# Patient Record
Sex: FEMALE | Race: WHITE | NOT HISPANIC OR LATINO | Employment: FULL TIME | URBAN - METROPOLITAN AREA
[De-identification: names, ages, dates, MRNs, and addresses within clinical notes are randomized per-mention and may not be internally consistent; named-entity substitution may affect disease eponyms.]

---

## 2017-03-16 ENCOUNTER — OFFICE VISIT (OUTPATIENT)
Dept: INTERNAL MEDICINE | Facility: CLINIC | Age: 22
End: 2017-03-16

## 2017-03-16 VITALS
RESPIRATION RATE: 18 BRPM | DIASTOLIC BLOOD PRESSURE: 83 MMHG | SYSTOLIC BLOOD PRESSURE: 128 MMHG | HEIGHT: 66 IN | BODY MASS INDEX: 40.5 KG/M2 | HEART RATE: 88 BPM | WEIGHT: 252 LBS | OXYGEN SATURATION: 98 % | TEMPERATURE: 98.4 F

## 2017-03-16 DIAGNOSIS — F41.8 DEPRESSION WITH ANXIETY: ICD-10-CM

## 2017-03-16 DIAGNOSIS — R63.1 POLYDIPSIA: ICD-10-CM

## 2017-03-16 DIAGNOSIS — F40.10 SOCIAL ANXIETY DISORDER: ICD-10-CM

## 2017-03-16 DIAGNOSIS — R53.83 FATIGUE, UNSPECIFIED TYPE: Primary | ICD-10-CM

## 2017-03-16 DIAGNOSIS — F34.1 PERSISTENT DEPRESSIVE DISORDER: Primary | ICD-10-CM

## 2017-03-16 DIAGNOSIS — R53.83 FATIGUE, UNSPECIFIED TYPE: ICD-10-CM

## 2017-03-16 LAB
ALBUMIN SERPL-MCNC: 3.6 G/DL (ref 3.4–5)
ALBUMIN UR-MCNC: NEGATIVE MG/DL
ALP SERPL-CCNC: 85 U/L (ref 40–150)
ALT SERPL W P-5'-P-CCNC: 23 U/L (ref 0–50)
AMORPH CRY #/AREA URNS HPF: ABNORMAL /HPF
ANION GAP SERPL CALCULATED.3IONS-SCNC: 9 MMOL/L (ref 3–14)
APPEARANCE UR: ABNORMAL
AST SERPL W P-5'-P-CCNC: 11 U/L (ref 0–45)
BACTERIA #/AREA URNS HPF: ABNORMAL /HPF
BASOPHILS # BLD AUTO: 0.1 10E9/L (ref 0–0.2)
BASOPHILS NFR BLD AUTO: 0.4 %
BILIRUB SERPL-MCNC: 0.4 MG/DL (ref 0.2–1.3)
BILIRUB UR QL STRIP: NEGATIVE
BUN SERPL-MCNC: 12 MG/DL (ref 7–30)
CALCIUM SERPL-MCNC: 9.1 MG/DL (ref 8.5–10.1)
CHLORIDE SERPL-SCNC: 106 MMOL/L (ref 94–109)
CO2 SERPL-SCNC: 27 MMOL/L (ref 20–32)
COLOR UR AUTO: YELLOW
CREAT SERPL-MCNC: 0.93 MG/DL (ref 0.52–1.04)
DIFFERENTIAL METHOD BLD: ABNORMAL
EOSINOPHIL # BLD AUTO: 0.2 10E9/L (ref 0–0.7)
EOSINOPHIL NFR BLD AUTO: 1.6 %
ERYTHROCYTE [DISTWIDTH] IN BLOOD BY AUTOMATED COUNT: 14.6 % (ref 10–15)
FERRITIN SERPL-MCNC: 33 NG/ML (ref 12–150)
GFR SERPL CREATININE-BSD FRML MDRD: 76 ML/MIN/1.7M2
GLUCOSE SERPL-MCNC: 90 MG/DL (ref 70–99)
GLUCOSE UR STRIP-MCNC: NEGATIVE MG/DL
HBA1C MFR BLD: 5.6 % (ref 4.3–6)
HCT VFR BLD AUTO: 44 % (ref 35–47)
HGB BLD-MCNC: 13.8 G/DL (ref 11.7–15.7)
HGB UR QL STRIP: NEGATIVE
IMM GRANULOCYTES # BLD: 0 10E9/L (ref 0–0.4)
IMM GRANULOCYTES NFR BLD: 0.4 %
KETONES UR STRIP-MCNC: NEGATIVE MG/DL
LEUKOCYTE ESTERASE UR QL STRIP: ABNORMAL
LYMPHOCYTES # BLD AUTO: 2.6 10E9/L (ref 0.8–5.3)
LYMPHOCYTES NFR BLD AUTO: 22.7 %
MCH RBC QN AUTO: 28.1 PG (ref 26.5–33)
MCHC RBC AUTO-ENTMCNC: 31.4 G/DL (ref 31.5–36.5)
MCV RBC AUTO: 90 FL (ref 78–100)
MONOCYTES # BLD AUTO: 0.8 10E9/L (ref 0–1.3)
MONOCYTES NFR BLD AUTO: 7.4 %
MUCOUS THREADS #/AREA URNS LPF: PRESENT /LPF
NEUTROPHILS # BLD AUTO: 7.7 10E9/L (ref 1.6–8.3)
NEUTROPHILS NFR BLD AUTO: 67.5 %
NITRATE UR QL: NEGATIVE
NRBC # BLD AUTO: 0 10*3/UL
NRBC BLD AUTO-RTO: 0 /100
PH UR STRIP: 7 PH (ref 5–7)
PLATELET # BLD AUTO: 364 10E9/L (ref 150–450)
POTASSIUM SERPL-SCNC: 3.8 MMOL/L (ref 3.4–5.3)
PROT SERPL-MCNC: 8.4 G/DL (ref 6.8–8.8)
RBC # BLD AUTO: 4.91 10E12/L (ref 3.8–5.2)
RBC #/AREA URNS AUTO: 3 /HPF (ref 0–2)
SODIUM SERPL-SCNC: 142 MMOL/L (ref 133–144)
SP GR UR STRIP: 1.03 (ref 1–1.03)
SQUAMOUS #/AREA URNS AUTO: 5 /HPF (ref 0–1)
TSH SERPL DL<=0.005 MIU/L-ACNC: 2 MU/L (ref 0.4–4)
URN SPEC COLLECT METH UR: ABNORMAL
UROBILINOGEN UR STRIP-MCNC: 2 MG/DL (ref 0–2)
WBC # BLD AUTO: 11.4 10E9/L (ref 4–11)
WBC #/AREA URNS AUTO: 5 /HPF (ref 0–2)

## 2017-03-16 RX ORDER — FLUOXETINE 40 MG/1
CAPSULE ORAL
Qty: 60 CAPSULE | Refills: 1 | Status: SHIPPED | OUTPATIENT
Start: 2017-03-16 | End: 2017-08-21

## 2017-03-16 ASSESSMENT — ENCOUNTER SYMPTOMS
HOARSE VOICE: 0
HYPERTENSION: 0
MYALGIAS: 0
HYPOTENSION: 0
WEAKNESS: 0
FLANK PAIN: 0
SMELL DISTURBANCE: 0
MUSCLE WEAKNESS: 0
FATIGUE: 1
DYSURIA: 0
DEPRESSION: 1
BREAST MASS: 0
POLYDIPSIA: 1
DIZZINESS: 0
NERVOUS/ANXIOUS: 1
RECTAL PAIN: 0
LOSS OF CONSCIOUSNESS: 0
RESPIRATORY PAIN: 0
BLOATING: 0
SYNCOPE: 0
DECREASED CONCENTRATION: 1
JAUNDICE: 0
CONSTIPATION: 0
EXERCISE INTOLERANCE: 0
HEMATURIA: 0
LEG PAIN: 0
JOINT SWELLING: 0
SWOLLEN GLANDS: 0
HALLUCINATIONS: 0
DIARRHEA: 0
BREAST PAIN: 0
NECK PAIN: 0
HOT FLASHES: 0
SNORES LOUDLY: 0
POLYPHAGIA: 0
SPUTUM PRODUCTION: 0
TREMORS: 0
TACHYCARDIA: 0
DISTURBANCES IN COORDINATION: 0
WEIGHT GAIN: 0
DIFFICULTY URINATING: 0
POOR WOUND HEALING: 0
CHILLS: 0
INSOMNIA: 1
ABDOMINAL PAIN: 0
EYE REDNESS: 0
ALTERED TEMPERATURE REGULATION: 0
CLAUDICATION: 0
EYE PAIN: 0
SINUS CONGESTION: 0
COUGH DISTURBING SLEEP: 0
NUMBNESS: 0
PANIC: 1
HEMOPTYSIS: 0
TINGLING: 0
SLEEP DISTURBANCES DUE TO BREATHING: 0
MUSCLE CRAMPS: 0
PALPITATIONS: 0
BACK PAIN: 1
EYE IRRITATION: 0
INCREASED ENERGY: 1
FEVER: 0
SINUS PAIN: 0
NAIL CHANGES: 0
DECREASED LIBIDO: 0
DECREASED APPETITE: 1
COUGH: 0
LEG SWELLING: 0
HEADACHES: 0
DOUBLE VISION: 0
ORTHOPNEA: 0
EYE WATERING: 0
SHORTNESS OF BREATH: 0
MEMORY LOSS: 0
NECK MASS: 0
WHEEZING: 0
EXTREMITY NUMBNESS: 0
POSTURAL DYSPNEA: 0
DYSPNEA ON EXERTION: 0
SEIZURES: 0
SPEECH CHANGE: 0
PARALYSIS: 0
NIGHT SWEATS: 0
SORE THROAT: 0
WEIGHT LOSS: 0
TROUBLE SWALLOWING: 0
RECTAL BLEEDING: 0
BRUISES/BLEEDS EASILY: 0
NAUSEA: 0
VOMITING: 0
ARTHRALGIAS: 1
HEARTBURN: 0
STIFFNESS: 0
SKIN CHANGES: 0
BOWEL INCONTINENCE: 0
TASTE DISTURBANCE: 0
LIGHT-HEADEDNESS: 0
BLOOD IN STOOL: 0

## 2017-03-16 ASSESSMENT — ACTIVITIES OF DAILY LIVING (ADL)
DO_MEMBERS_OF_YOUR_HOUSEHOLD_USE_SUNSCREEN?: Y
ARE_THERE_SMOKE_DETECTORS_IN_YOUR_HOME?: Y
ARE_THERE_FIREARMS_IN_YOUR_HOME?: Y
DO_MEMBERS_OF_YOUR_HOUSEHOLD_USE_SAFETY_HELMETS?: Y
DO_MEMBERS_OF_YOUR_HOUSEHOLD_WEAR_SEAT_BELTS?: Y
ARE_THERE_CARBON_MONOXIDE_DETECTORS_IN_YOUR_HOME?: Y

## 2017-03-16 ASSESSMENT — PAIN SCALES - GENERAL: PAINLEVEL: NO PAIN (0)

## 2017-03-16 NOTE — PROGRESS NOTES
HPI:       Jennifer Craft is a 22 year old female who presents for the following  Patient presents with: Establish Care (Patient is here to establish a new PCP)    Desire is here to establish care. She has been quite tired lately, sleeping 14+hrs at a time and not feeling rested. This has been present for several months. It started when stress at school was increased, which led to not eating right (fast food). She also has been thirsty at times. She is drinking 4-5 glasses/day, along with mild, soda. She is not sure she is urinating more.     She also notes depression-like periods, b/c nothing formally diagnosed. She had similar symptoms back in high school. And short instances of this since then.     She has an IUD in place and is due to be removed/replaced in 12/2017.     Problem, Medication and Allergy Lists were reviewed and are current.  Patient is a new patient to this clinic and so  I reviewed/updated the Past Medical History, the Family History and the Social History.     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 Now, the inability to concentrate has impacted her school. She wasn't able to get up for class and started failing classes. She is now looking for work.       Review of Systems:   Review of Systems     Constitutional:  Positive for fatigue, decreased appetite and recent stressors. Negative for fever, chills, weight loss, weight gain, night sweats, height loss, post-operative complications, incisional pain, hallucinations, hyperactivity and confused.   HENT:  Negative for ear pain, hearing loss, tinnitus, nosebleeds, trouble swallowing, hoarse voice, mouth sores, sore throat, ear discharge, tooth pain, gum tenderness, taste disturbance, smell disturbance, hearing aid, bleeding gums, dry mouth, sinus pain, sinus congestion and neck mass.    Eyes:  Negative for double vision, pain, redness, eye pain, decreased vision,  eye watering, eye bulging, eye dryness, flashing lights, spots, floaters, strabismus, tunnel vision, jaundice and eye irritation.   Respiratory:   Negative for cough, hemoptysis, sputum production, shortness of breath, wheezing, sleep disturbances due to breathing, snores loudly, respiratory pain, dyspnea on exertion, cough disturbing sleep and postural dyspnea.    Cardiovascular:  Negative for chest pain, dyspnea on exertion, palpitations, orthopnea, claudication, leg swelling, fingers/toes turn blue, hypertension, hypotension, syncope, history of heart murmur, chest pain on exertion, chest pain at rest, pacemaker, few scattered varicosities, leg pain, sleep disturbances due to breathing, tachycardia, light-headedness, exercise intolerance and edema.   Gastrointestinal:  Negative for heartburn, nausea, vomiting, abdominal pain, diarrhea, constipation, blood in stool, melena, rectal pain, bloating, hemorrhoids, bowel incontinence, jaundice, rectal bleeding, coffee ground emesis and change in stool.   Genitourinary:  Negative for bladder incontinence, dysuria, urgency, hematuria, flank pain, vaginal discharge, difficulty urinating, genital sores, dyspareunia, decreased libido, nocturia, voiding less frequently, arousal difficulty, abnormal vaginal bleeding, excessive menstruation, menstrual changes, hot flashes, vaginal dryness and postmenopausal bleeding.   Musculoskeletal:  Positive for back pain and arthralgias. Negative for myalgias, joint swelling, stiffness, muscle cramps, neck pain, bone pain, muscle weakness and fracture.   Skin:  Negative for nail changes, itching, poor wound healing, rash, hair changes, skin changes, acne, warts, poor wound healing, scarring, flaky skin, Raynaud's phenomenon, sensitivity to sunlight and skin thickening.   Neurological:  Negative for dizziness, tingling, tremors, speech change, seizures, loss of consciousness, weakness, light-headedness, numbness, headaches, disturbances in  "coordination, extremity numbness, memory loss, difficulty walking and paralysis.   Endo/Heme:  Negative for anemia, swollen glands and bruises/bleeds easily.   Psychiatric/Behavioral:  Positive for depression, decreased concentration, mood swings and panic attacks. Negative for hallucinations and memory loss.    Breast:  Negative for breast discharge, breast mass, breast pain and nipple retraction.   Endocrine:  Positive for polydipsia.Negative for altered temperature regulation, polyphagia, unwanted hair growth and change in facial hair.            Physical Exam:   /83 (BP Location: Right arm, Patient Position: Chair, Cuff Size: Adult Large)  Pulse 88  Temp 98.4  F (36.9  C) (Oral)  Resp 18  Ht 1.664 m (5' 5.5\")  Wt 114.3 kg (252 lb)  SpO2 98%  Breastfeeding? No  BMI 41.3 kg/m2  Body mass index is 41.3 kg/(m^2).  Vitals were reviewed     Physical Exam   Constitutional: She is oriented to person, place, and time and well-developed, well-nourished, and in no distress. No distress.   HENT:   Head: Normocephalic and atraumatic.   Right Ear: Tympanic membrane normal.   Left Ear: Tympanic membrane normal.   Mouth/Throat: Oropharynx is clear and moist.   Eyes: Conjunctivae and EOM are normal. Pupils are equal, round, and reactive to light. No scleral icterus.   Neck: Neck supple. No thyromegaly present.   Cardiovascular: Normal rate, regular rhythm, normal heart sounds and intact distal pulses.  Exam reveals no gallop and no friction rub.    No murmur heard.  Pulmonary/Chest: Effort normal and breath sounds normal. No respiratory distress. She has no wheezes.   Abdominal: Soft. Bowel sounds are normal. She exhibits no distension. There is no tenderness.   Musculoskeletal: Normal range of motion. She exhibits no edema.   Lymphadenopathy:     She has no cervical adenopathy.   Neurological: She is alert and oriented to person, place, and time. She exhibits normal muscle tone. Gait normal. Coordination normal.   Skin: " Skin is warm and dry. No rash noted. She is not diaphoretic.   Psych: Not making great eye contact. Squeezing small stuffed ball with hands throughout encounter. Answers very brief.         Results:     Pending.     BUSHRA-7: 11  PHQ-9: 15    Assessment and Plan     Jennifer was seen today for establish care. Her main concern today is fatigue. I will go ahead and test to see if there is an organic etiology behind this, including vit d, thyroid, diabetes, anemia, electrolyte abnormalities. However, I'm more suspicious, enrique given her BUSHRA-7 and PHQ-9 that there may be more of a mental health component with depression/anxiety. I also wonder about whether or not there could be an underlying learning disability vs ADHD vs autism spectrum disorder. To this end, I am going to start her on prozac for her mood, and have her return in 4 weeks. I will also have her see Harish Rosas today, to see what type of therapy services she may need.     Diagnoses and all orders for this visit:    Fatigue, unspecified type  -     TSH with free T4 reflex; Future  -     CBC with platelets differential; Future  -     Ferritin; Future  -     Vitamin D Deficiency **(YR); Future    Depression with anxiety  -     FLUoxetine (PROZAC) 40 MG capsule; Take 20 mg daily for one week, then increase to 40 mg daily    Polydipsia  -     Hemoglobin A1c; Future  -     Comprehensive metabolic panel; Future  -     UA with Micro reflex to Culture; Future    Health Maintenance   Needs to have IUD changed in December. Will defer pap to that time.   Health Maintenance   Topic Date Due     CHLAMYDIA SCREENING  1995     PEDS DTAP/TDAP (1 - Tdap) 01/27/2003     HPV IMMUNIZATION (1 of 3 - Female 3 Dose Series) 01/27/2007     TETANUS IMMUNIZATION (SYSTEM ASSIGNED)  01/27/2014     PAP SCREENING Q3 YR (SYSTEM ASSIGNED)  01/27/2017     INFLUENZA VACCINE (SYSTEM ASSIGNED)  09/01/2017       Options for treatment and follow-up care were reviewed with the patient.  Jennifer Craft engaged in the decision making process and verbalized understanding of the options discussed and agreed with the final plan.    Bree Marie MD  Mar 16, 2017

## 2017-03-16 NOTE — MR AVS SNAPSHOT
After Visit Summary   3/16/2017    Jennifer Craft    MRN: [id number]           Patient Information     Date Of Birth          1995        Visit Information        Provider Department      3/16/2017 3:00 PM Bree Castelan MD [facility]        Today's Diagnoses     Fatigue, unspecified type    -  1    Depression with anxiety        Polydipsia          Care Instructions    Primary Care Center Medication Refill Request Information:  * Please contact your pharmacy regarding ANY request for medication refills.  ** [facility] Prescription Fax = [phone]  * Please allow 3 business days for routine medication refills.  * Please allow 5 business days for controlled substance medication refills.     Primary Care Center Test Result notification information:  *You will be notified with in 7-10 days of your appointment day regarding the results of your test.  If you are on MyChart you will be notified as soon as the provider has reviewed the results and signed off on them.    Primary Care Center [phone] ([facility], 4th Floor N) Call To Schedule 4 week follow up        Follow-ups after your visit        Follow-up notes from your care team     Return in about 4 weeks (around 4/13/2017) for Depression, Anxiety.      Your next 10 appointments already scheduled     Mar 16, 2017  4:30 PM CDT   LAB with  LAB    Health Lab ([facility])    [address] [place] [address]  1st Floor  [facility] [address] [phone]           Patient must bring picture ID.  Patient should be prepared to give a urine specimen  Please do not eat 10-12 hours before your appointment if you are coming in fasting for labs on lipids, cholesterol, or glucose (sugar).  Pregnant women should follow their Care Team instructions. Water with medications is okay. Do not drink coffee or other fluids.   If you have concerns about taking  your medications, please ask at office or if scheduling via  [employer], send a message by clicking on Secure Messaging, Message Your Care Team.              Future tests that were ordered for you today     Open Future Orders        Priority Expected Expires Ordered    TSH with free T4 reflex Routine 3/16/2017 3/30/2017 3/16/2017    Hemoglobin A1c Routine 3/16/2017 3/30/2017 3/16/2017    Comprehensive metabolic panel Routine 3/16/2017 3/30/2017 3/16/2017    CBC with platelets differential Routine 3/16/2017 3/30/2017 3/16/2017    Ferritin Routine 3/16/2017 3/16/2018 3/16/2017    Vitamin D Deficiency **(YR) Routine 3/16/2018 7/29/2018 3/16/2017    UA with Micro reflex to Culture Routine 3/16/2017 3/16/2018 3/16/2017            Who to contact     Please call your clinic at [phone] to:    Ask questions about your health    Make or cancel appointments    Discuss your medicines    Learn about your test results    Speak to your doctor   If you have compliments or concerns about an experience at your clinic, or if you wish to file a complaint, please contact [facility] Patient Relations at [phone] or email us at [email or web address]         Additional Information About Your Visit        Catapult Information     [employer]ult gives you secure access to your electronic health record. If you see a primary care provider, you can also send messages to your care team and make appointments. If you have questions, please call your primary care clinic.  If you do not have a primary care provider, please call [phone] and they will assist you.      Catapult is an electronic gateway that provides easy, online access to your medical records. With [employer], you can request a clinic appointment, read your test results, renew a prescription or communicate with your care team.     To access your existing account, please contact your [facility] or call [phone] for assistance.        Care EveryWhere ID     This is your Care  "EveryWhere ID. This could be used by other organizations to access your [place] medical records  [id number]        Your Vitals Were     Pulse Temperature Respirations Height Pulse Oximetry Breastfeeding?    88 98.4  F (36.9  C) (Oral) 18 1.664 m (5' 5.5\") 98% No    BMI (Body Mass Index)                   41.3 kg/m2            Blood Pressure from Last 3 Encounters:   03/16/17 128/83   12/31/14 120/66   05/19/11 113/69    Weight from Last 3 Encounters:   03/16/17 114.3 kg (252 lb)   12/31/14 97.1 kg (214 lb) (98 %)*   05/19/11 86.8 kg (191 lb 5.8 oz) (98 %)*     * Growth percentiles are based on [facility] 2-20 Years data.                 Today's Medication Changes          These changes are accurate as of: 3/16/17  4:10 PM.  If you have any questions, ask your nurse or doctor.               Start taking these medicines.        Dose/Directions    FLUoxetine 40 MG capsule   Commonly known as:  PROzac   Used for:  Depression with anxiety   Started by:  Bree Castelan MD        Take 20 mg daily for one week, then increase to 40 mg daily   Quantity:  60 capsule   Refills:  1            Where to get your medicines      These medications were sent to [facility] - [place], [place] - [address]  [address] [facility] [address]    Hours:  Mon-Fri 8:30am-5:00pm Toll Free [phone] Phone:  [phone]     FLUoxetine 40 MG capsule                Primary Care Provider Office Phone # Fax #    Bala Perkins [phone]       PRIMARY CARE CENTER [address] [place] [id number]        Thank you!     Thank you for choosing [facility]  for your care. Our goal is always to provide you with excellent care. Hearing back from our patients is one way we can continue to improve our services. Please take a few minutes to complete the written survey that you may receive in the mail after your visit with us. Thank you!           "   Your Updated Medication List - Protect others around you: Learn how to safely use, store and throw away your medicines at www.disposemymeds.org.          This list is accurate as of: 3/16/17  4:10 PM.  Always use your most recent med list.                   Brand Name Dispense Instructions for use    FLUoxetine 40 MG capsule    PROzac    60 capsule    Take 20 mg daily for one week, then increase to 40 mg daily       Levonorgestrel 13.5 MG Iud   Start taking on:  12/30/2017     1 Intra Uterine Device    1 each (13.5 mg) by Intrauterine route once for 1 dose

## 2017-03-16 NOTE — MR AVS SNAPSHOT
After Visit Summary   3/16/2017    Jennifer Craft    MRN: [id number]           Patient Information     Date Of Birth          1995        Visit Information        Provider Department      3/16/2017 3:45 PM [facility]        Today's Diagnoses     Persistent depressive disorder    -  1    Social anxiety disorder           Follow-ups after your visit        Who to contact     Please call your clinic at [phone] to:    Ask questions about your health    Make or cancel appointments    Discuss your medicines    Learn about your test results    Speak to your doctor   If you have compliments or concerns about an experience at your clinic, or if you wish to file a complaint, please contact [facility] Patient Relations at [phone] or email us at [email or web address]         Additional Information About Your Visit        MyChart Information     [employer] gives you secure access to your electronic health record. If you see a primary care provider, you can also send messages to your care team and make appointments. If you have questions, please call your primary care clinic.  If you do not have a primary care provider, please call [phone] and they will assist you.      [employer] is an electronic gateway that provides easy, online access to your medical records. With [employer], you can request a clinic appointment, read your test results, renew a prescription or communicate with your care team.     To access your existing account, please contact your [facility] or call [phone] for assistance.        Care EveryWhere ID     This is your Care EveryWhere ID. This could be used by other organizations to access your [place] medical records  [id number]         Blood Pressure from Last 3 Encounters:   03/16/17 128/83   12/31/14 120/66   05/19/11 113/69    Weight from Last 3 Encounters:    03/16/17 114.3 kg (252 lb)   12/31/14 97.1 kg (214 lb) (98 %)*   05/19/11 86.8 kg (191 lb 5.8 oz) (98 %)*     * Growth percentiles are based on [facility] 2-20 Years data.              Today, you had the following     No orders found for display         Today's Medication Changes          These changes are accurate as of: 3/16/17 11:59 PM.  If you have any questions, ask your nurse or doctor.               Start taking these medicines.        Dose/Directions    FLUoxetine 40 MG capsule   Commonly known as:  PROzac   Used for:  Depression with anxiety   Started by:  Bree Castelan MD        Take 20 mg daily for one week, then increase to 40 mg daily   Quantity:  60 capsule   Refills:  1            Where to get your medicines      These medications were sent to [facility] - [address]  [address] [facility] [address]    Hours:  Mon-Fri 8:30am-5:00pm Toll Free [phone] Phone:  [phone]     FLUoxetine 40 MG capsule                Primary Care Provider Office Phone # Fax #    Bree Marie [phone]       [address]  [facility] [address]        Thank you!     Thank you for choosing [facility]  for your care. Our goal is always to provide you with excellent care. Hearing back from our patients is one way we can continue to improve our services. Please take a few minutes to complete the written survey that you may receive in the mail after your visit with us. Thank you!             Your Updated Medication List - Protect others around you: Learn how to safely use, store and throw away your medicines at www.disposemymeds.org.          This list is accurate as of: 3/16/17 11:59 PM.  Always use your most recent med list.                   Brand Name Dispense Instructions for use    FLUoxetine 40 MG capsule    PROzac    60 capsule    Take 20 mg daily for one week,  then increase to 40 mg daily       Levonorgestrel 13.5 MG Iud   Start taking on:  12/30/2017     1 Intra Uterine Device    1 each (13.5 mg) by Intrauterine route once for 1 dose

## 2017-03-16 NOTE — NURSING NOTE
Chief Complaint   Patient presents with     Establish Care     Patient is here to establish a new PCP     Yolanda Phillips CMA 3:02 PM on 3/16/2017.

## 2017-03-16 NOTE — PATIENT INSTRUCTIONS
Primary Care Center Medication Refill Request Information:  * Please contact your pharmacy regarding ANY request for medication refills.  ** [facility] Prescription Fax = [phone]  * Please allow 3 business days for routine medication refills.  * Please allow 5 business days for controlled substance medication refills.     Primary Care Center Test Result notification information:  *You will be notified with in 7-10 days of your appointment day regarding the results of your test.  If you are on MyChart you will be notified as soon as the provider has reviewed the results and signed off on them.    Primary Care Center [phone] ([facility] 4th [facility] [address]) Call To Schedule 4 week follow up

## 2017-03-17 LAB — DEPRECATED CALCIDIOL+CALCIFEROL SERPL-MC: 16 UG/L (ref 20–75)

## 2017-03-31 ASSESSMENT — ANXIETY QUESTIONNAIRES
3. WORRYING TOO MUCH ABOUT DIFFERENT THINGS: SEVERAL DAYS
4. TROUBLE RELAXING: SEVERAL DAYS
GAD7 TOTAL SCORE: 11
IF YOU CHECKED OFF ANY PROBLEMS ON THIS QUESTIONNAIRE, HOW DIFFICULT HAVE THESE PROBLEMS MADE IT FOR YOU TO DO YOUR WORK, TAKE CARE OF THINGS AT HOME, OR GET ALONG WITH OTHER PEOPLE: VERY DIFFICULT
2. NOT BEING ABLE TO STOP OR CONTROL WORRYING: NEARLY EVERY DAY
1. FEELING NERVOUS, ANXIOUS, OR ON EDGE: MORE THAN HALF THE DAYS
6. BECOMING EASILY ANNOYED OR IRRITABLE: MORE THAN HALF THE DAYS
5. BEING SO RESTLESS THAT IT IS HARD TO SIT STILL: NOT AT ALL
7. FEELING AFRAID AS IF SOMETHING AWFUL MIGHT HAPPEN: MORE THAN HALF THE DAYS

## 2017-03-31 NOTE — PROGRESS NOTES
[facility] (PCC referral)  March 31, 2017      Behavioral Health Clinician Progress Note    Patient Name: Jennifer Craft           Service Type: Individual      Service Location:   Face to Face in Clinic     Session Start Time: 345pm  Session End Time: 415pm      Session Length: 16 - 37      Attendees: Patient and Mother    Visit Activities (Refresh list every visit): [facility] and [facility] Only    Diagnostic Assessment Date: none on file  Treatment Plan Review Date: none on file    See Flowsheets for today's PHQ-9 and BUSHRA-7 results  Previous PHQ-9:   PHQ-9 SCORE 12/31/2014   Total Score 5     Previous BUSHRA-7: No flowsheet data found.    NITA LEVEL:  No flowsheet data found.    DATA  Extended Session (60+ minutes): No  Interactive Complexity: No  Crisis: No    Treatment Objective(s) Addressed in This Session:  Target Behavior(s): disease management/lifestyle changes      Patient  will develop coping/problem-solving skills to facilitate more adaptive adjustment and will effectively address problems that interfere with adaptive functioning    Current Stressors / Issues:  [facility] met with Jennifer at health care team's request to assess her current behavioral health needs and provide appropriate intervention.     I was invited to meet with Jennifer and her mother today due to Dr. Coronado's concerns about possible stress, depression, or anxiety as a component of Jennifer's current symptoms. Jennifer's PHQ-9 scores at 15 today and her BUSHRA-7 scores at 11. This indicates moderate symptoms of depression and anxiety, and corresponds to her self-report of symptoms during the meeting.    Jennifer reported that she has recently left her college ([employer] in [address]).  She had fond that she was increasingly struggling with her class work - she was in her 3rd year, however, and pursuing a dream of becoming an . This has been a big disappointment for her, as she feels she may not be  able to return to school, may have to change life plans. In addition, she left a good group of friends and a boyfriend behind, at least for now. She is living at home with her mother and grandparents and finds this a mixed experience, as her grandmother is very critical and negative. Jennifer reports she is quite close to her mother.    Jennifer reports a history of depression and anxiety in high school, particularly social anxiety.     We discussed resources available to them and they said they are open to Jennifer receiving some counseling support. Dr Coronado is prescribing some medication today to assist in mood as well.    We discussed several strategies to help her get through this time - structuring her day with positive activities, exploring ideas for what might be next in her life that she can get excited about, receiving support from friends, family and possibly a therapist.    I affirmed the steps this patient has taken to address physical and behavioral health issues, and offered continued behavioral health services or referral, now or in the future, as needed by the patient. They are not interested in meeting with me for follow up [place] [facility], but will explore options in their area.    Progress on Treatment Objective(s) / Homework:  New Objective established this session - CONTEMPLATION (Considering change and yet undecided); Intervened by assessing the negative and positive thinking (ambivalence) about behavior change    Psycho-education regarding mental health diagnoses and treatment options    Motivational Interviewing    Solution-Focused Therapy    Explored patterns in patient's behaviors and relationships and discussed options for new behaviors    Explored new options for problem-solving, communication, managing stress, etc.    Cognitive-behavioral Therapy    Discussed common cognitive distortions, identified them in patient's life    Explored ways to challenge,  replace, and act against these cognitions    Explore behavioral changes that might benefit patient in improving mood and engage in meaningful activity    Behavioral Activation    Discussed steps patient can take to become more involved in meaningful activity    Identified barriers to these activities and explored possible solutions    Care Plan review completed: No    Medication Review:  Please see medical provider note from today for medication review.    Medication Compliance:  Please see medical provider note from today for medication compliance review..    Changes in Health Issues:  Please see medical provider note from today.    Chemical Use Review:   Substance Use: Chemical use reviewed, no active concerns identified      Tobacco Use: No current tobacco use.      Assessment: Current Emotional / Mental Status (status of significant symptoms):  Risk status (Self / Other harm or suicidal ideation)  Patient has had a history of suicidal ideation: some passive thoughts, no attempts  Patient denies current fears or concerns for personal safety.  Patient denies current or recent suicidal ideation or behaviors.  Patient denies current or recent homicidal ideation or behaviors.  Patient denies current or recent self injurious behavior or ideation.  Patient denies other safety concerns.  A safety and risk management plan has not been developed at this time, however patient was encouraged to call [place] [phone] should there be a change in any of these risk factors.    Appearance:   Appropriate   Eye Contact:   Fair   Psychomotor Behavior: Retarded (Slowed)   Attitude:   Cooperative   Orientation:   All  Speech   Rate / Production: Normal    Volume:  Soft   Mood:    Anxious  Depressed  Sad   Affect:    Subdued   Thought Content:  Clear   Thought Form:  Coherent  Logical   Insight:    Fair     Diagnoses:  1. Persistent depressive disorder    2. Social anxiety disorder      Collateral Reports Completed:  Not  Applicable    Plan: (Homework, other):  Patient was given information about behavioral services and encouraged to schedule a follow up appointment with the clinic [facility] as needed.  She was also given information about mental health symptoms and treatment options .  CD Recommendations: No indications of CD issues. We discussed resources available to them and they said they are open to Jennifer receiving some counseling support. Dr Coronado is prescribing some medication today to assist in mood as well. We discussed several strategies to help her get through this time - structuring her day with positive activities, exploring ideas for what might be next in her life that she can get excited about, receiving support from friends, family and possibly a therapist. They are not interested in meeting with me for follow up since [place] [facility], but will explore options in their area.  AZAM Sotomayor, [facility]

## 2017-04-01 ASSESSMENT — ANXIETY QUESTIONNAIRES: GAD7 TOTAL SCORE: 11

## 2017-04-01 ASSESSMENT — PATIENT HEALTH QUESTIONNAIRE - PHQ9: SUM OF ALL RESPONSES TO PHQ QUESTIONS 1-9: 15

## 2017-07-29 ENCOUNTER — HEALTH MAINTENANCE LETTER (OUTPATIENT)
Age: 22
End: 2017-07-29

## 2017-08-01 ENCOUNTER — OFFICE VISIT - HEALTHEAST (OUTPATIENT)
Dept: FAMILY MEDICINE | Facility: CLINIC | Age: 22
End: 2017-08-01

## 2017-08-01 DIAGNOSIS — J32.9 SINUSITIS: ICD-10-CM

## 2017-08-21 ENCOUNTER — MYC REFILL (OUTPATIENT)
Dept: INTERNAL MEDICINE | Facility: CLINIC | Age: 22
End: 2017-08-21

## 2017-08-21 DIAGNOSIS — F41.8 DEPRESSION WITH ANXIETY: ICD-10-CM

## 2017-08-22 RX ORDER — FLUOXETINE 40 MG/1
40 CAPSULE ORAL DAILY
Qty: 30 CAPSULE | Refills: 0 | Status: SHIPPED | OUTPATIENT
Start: 2017-08-22 | End: 2017-09-14

## 2017-08-22 NOTE — TELEPHONE ENCOUNTER
Fluoxetine 40 mg caps      Last Written Prescription Date:  3-16-17  Last Fill Quantity: 60,   # refills: 1  Last Office Visit : 3-16-17  Future Office visit:  None    She was to RTC in 1 month for re-evaluation of new medication.    Kathleen M Doege RN

## 2017-08-22 NOTE — TELEPHONE ENCOUNTER
Message from Marv:  Janette Young RN Mon Aug 21, 2017 1:03 PM        ----- Message -----   From: Jennifer Craft   Sent: 8/21/2017 9:10 AM   To: Pcc Nursing Staff-  Subject: Medication Renewal Request     Original authorizing provider: MD Jennifer Armas would like a refill of the following medications:  FLUoxetine (PROZAC) 40 MG capsule [Bree Marie MD]    Preferred pharmacy: [facility] - [place], [place] - [facility] [address]    Comment:

## 2017-09-14 ENCOUNTER — OFFICE VISIT (OUTPATIENT)
Dept: INTERNAL MEDICINE | Facility: CLINIC | Age: 22
End: 2017-09-14

## 2017-09-14 VITALS
BODY MASS INDEX: 42.28 KG/M2 | HEART RATE: 67 BPM | DIASTOLIC BLOOD PRESSURE: 79 MMHG | WEIGHT: 258 LBS | SYSTOLIC BLOOD PRESSURE: 120 MMHG

## 2017-09-14 DIAGNOSIS — F41.8 DEPRESSION WITH ANXIETY: ICD-10-CM

## 2017-09-14 RX ORDER — FLUOXETINE 40 MG/1
40 CAPSULE ORAL DAILY
Qty: 90 CAPSULE | Refills: 3 | Status: SHIPPED | OUTPATIENT
Start: 2017-09-14 | End: 2018-08-23

## 2017-09-14 ASSESSMENT — ANXIETY QUESTIONNAIRES
7. FEELING AFRAID AS IF SOMETHING AWFUL MIGHT HAPPEN: NOT AT ALL
GAD7 TOTAL SCORE: 3
1. FEELING NERVOUS, ANXIOUS, OR ON EDGE: SEVERAL DAYS
5. BEING SO RESTLESS THAT IT IS HARD TO SIT STILL: SEVERAL DAYS
3. WORRYING TOO MUCH ABOUT DIFFERENT THINGS: SEVERAL DAYS
6. BECOMING EASILY ANNOYED OR IRRITABLE: NOT AT ALL
2. NOT BEING ABLE TO STOP OR CONTROL WORRYING: NOT AT ALL
IF YOU CHECKED OFF ANY PROBLEMS ON THIS QUESTIONNAIRE, HOW DIFFICULT HAVE THESE PROBLEMS MADE IT FOR YOU TO DO YOUR WORK, TAKE CARE OF THINGS AT HOME, OR GET ALONG WITH OTHER PEOPLE: NOT DIFFICULT AT ALL

## 2017-09-14 ASSESSMENT — PATIENT HEALTH QUESTIONNAIRE - PHQ9
SUM OF ALL RESPONSES TO PHQ QUESTIONS 1-9: 1
5. POOR APPETITE OR OVEREATING: NOT AT ALL

## 2017-09-14 ASSESSMENT — PAIN SCALES - GENERAL: PAINLEVEL: NO PAIN (0)

## 2017-09-14 NOTE — PROGRESS NOTES
PRIMARY CARE CENTER       SUBJECTIVE:  Jennifer Craft is a 22 year old female who comes in for f/u on anxiety/depression.       Depression/Anxiety follow up       Status since last visit: Improved; everything feels more under control    What is going well? If something worried about, doesn't spiral constant worry    Life Stressors:Living with grandparents    Other associated symptoms :None    How is your sleep? Good    New Significant life event: No    Current substance abuse: None    Anxiety / Panic symptoms: No     Recent PHQ-9 Scores:     PHQ-9 SCORE 12/31/2014 3/31/2017   Total Score 5 -   Total Score - 15     Recent BUSHRA-7 Scores:      BUSHRA-7 SCORE 3/31/2017   Total Score 11         Today' sPHQ 9 Reviewed and it is 1 improving           Adherence and Exercise  Medication side effects: no  How often is a medication missed? Never  Exercise:strength training        Medications and allergies reviewed by me today.     ROS:   Constitutional, HEENT, cardiovascular, pulmonary, gi and gu systems are negative, except as otherwise noted.      OBJECTIVE:/79  Pulse 67  Wt 117 kg (258 lb)  Breastfeeding? No  BMI 42.28 kg/m2   Wt Readings from Last 1 Encounters:   09/14/17 117 kg (258 lb)     Gen: Pleasant female, in NAD  Resp: non-labored breathing  Psych: AOx3, normal mood/affect, no SI     ASSESSMENT/PLAN:    Jennifer was seen today for medication request.    Diagnoses and all orders for this visit:    Depression with anxiety  -     FLUoxetine (PROZAC) 40 MG capsule; Take 1 capsule (40 mg) by mouth daily       Pt should return to clinic for f/u with me in 1 year or sooner as needed.      Bree Marie MD  09/14/17

## 2017-09-14 NOTE — PATIENT INSTRUCTIONS
[facility] Medication Refill Request Information:  * Please contact your pharmacy regarding ANY request for medication refills.  ** [facility] Prescription Fax = [phone]  * Please allow 3 business days for routine medication refills.  * Please allow 5 business days for controlled substance medication refills.     [facility] Test Result notification information:  *You will be notified with in 7-10 days of your appointment day regarding the results of your test.  If you are on MyChart you will be notified as soon as the provider has reviewed the results and signed off on them.    [facility] [phone]     Schedule an appointment with me to have IUD exchanged.     Follow-up on Prozac in 1 year.

## 2017-09-14 NOTE — NURSING NOTE
Chief Complaint   Patient presents with     Medication Request     Patient here requesting medication for prozac.       Howard Mcgill CMA at 5:13 PM on 9/14/2017.

## 2017-09-15 ASSESSMENT — ANXIETY QUESTIONNAIRES: GAD7 TOTAL SCORE: 3

## 2017-12-04 ENCOUNTER — MYC MEDICAL ADVICE (OUTPATIENT)
Dept: OTHER | Age: 22
End: 2017-12-04

## 2017-12-11 DIAGNOSIS — Z30.430 ENCOUNTER FOR IUD INSERTION: Primary | ICD-10-CM

## 2017-12-14 ENCOUNTER — MYC MEDICAL ADVICE (OUTPATIENT)
Dept: INTERNAL MEDICINE | Facility: CLINIC | Age: 22
End: 2017-12-14

## 2017-12-21 ENCOUNTER — OFFICE VISIT (OUTPATIENT)
Dept: INTERNAL MEDICINE | Facility: CLINIC | Age: 22
End: 2017-12-21
Payer: COMMERCIAL

## 2017-12-21 VITALS — DIASTOLIC BLOOD PRESSURE: 72 MMHG | SYSTOLIC BLOOD PRESSURE: 111 MMHG | HEART RATE: 62 BPM

## 2017-12-21 DIAGNOSIS — Z30.430 ENCOUNTER FOR IUD INSERTION: ICD-10-CM

## 2017-12-21 DIAGNOSIS — Z12.4 SCREENING FOR CERVICAL CANCER: ICD-10-CM

## 2017-12-21 DIAGNOSIS — Z30.433 ENCOUNTER FOR REMOVAL AND REINSERTION OF INTRAUTERINE CONTRACEPTIVE DEVICE (IUD): Primary | ICD-10-CM

## 2017-12-21 LAB — HCG UR QL: NEGATIVE

## 2017-12-21 ASSESSMENT — PAIN SCALES - GENERAL: PAINLEVEL: NO PAIN (0)

## 2017-12-21 NOTE — NURSING NOTE
Chief Complaint   Patient presents with     IUD     Patient here for IUD placement.       Howard Mcgill CMA at 2:47 PM on 12/21/2017.

## 2017-12-21 NOTE — LETTER
1/5/2018         Jennifer Costa   [address] [facility] [address]        Dear Ms. Costa:      Your PAP smear was normal.     Results for orders placed or performed in visit on 12/21/17   Pap imaged thin layer screen with HPV - recommended age 30 - 65 years (select HPV order below)   Result Value Ref Range    PAP NIL     Copath Report         Patient Name: JENNIFER COSTA  MR#: [id number]  Specimen #: [id number]  Collected: 12/21/2017  Received: 12/22/2017  Reported: 12/27/2017 07:30  Ordering Phy(s): FAHEEM ROSALES    For improved result formatting, select 'View Enhanced Report Format' under   Linked Documents section.    SPECIMEN/STAIN PROCESS:  Pap imaged thin layer prep screening (Surepath, FocalPoint with guided   screening)       Pap-Cyto x 1    SOURCE: Cervical, endocervical  ----------------------------------------------------------------   Pap imaged thin layer prep screening (Surepath, FocalPoint with guided   screening)  SPECIMEN ADEQUACY:  Satisfactory for evaluation.  -Transformation zone component present.  -LMP not provided on specimen requisition.    CYTOLOGIC INTERPRETATION:    Negative for intraepithelial lesion or malignancy    Electronically signed out by:  HERMELINDA Tripp (ASCP)    Processed and screened at [facility]    CLIN ICAL HISTORY:    Papanicolaou Test Limitations:  Cervical cytology is a screening test with   limited sensitivity; regular  screening is critical for cancer prevention; Pap tests are primarily   effective for the diagnosis/prevention of  squamous cell carcinoma, not adenocarcinomas or other cancers.    TESTING LAB LOCATION:  [facility], [address]  [address] [phone]    COLLECTION SITE:  Client:  [facility]  Location: [facility] (B)             Please note that  test explanations are brief and do not reflect all diagnostic uses.  If you have any questions or concerns, please call the clinic at [phone].      Sincerely,      Carey Valderrama sent on behalf of  Daniella Casas MD

## 2017-12-21 NOTE — PROGRESS NOTES
[facility]  IUD Removal and Re-Insertion Note    Jennifer Craft is a patient of Bree Asencio here for an IUD insertion   Indication: contraception    Counseling: Discussed potential side effects of Paraguard, including increased bleeding and cramping, as well as the Mirena, including unpredictable spotting and amenorrhea.  Patient aware to check for strings every month.    Consent: Affirmation of informed consent was signed and scanned into the medical record. Risks, benefits and alternatives were discussed including risk of infection, bleeding and small risk of uterine perforation.  Patient's questions were elicited and answered.   Procedure safety checklist was completed:  Yes  Time Out (Pause for the Cause) completed: Yes    Labs: UPT negative    Technique:   Patient was premedicated with ibuprofen:   No  Uterine position was confirmed by bimanual exam: Yes   Using a sterile speculum and equipment, the cervix was examined. Pap smear was taken.     The cervix was cleansed with Betadine prep. Old IUD removed with vaginal forceps and intact.  A tenaculum was applied to the cervix with tension to straighten the cervical canal.  The uterus was sounded to 7cm.  A Mirena IUD was inserted in the usual fashion and strings trimmed 2cm below the cervix.  EBL: minimal  Complications: No  Tolerance: Pt tolerated procedure well and was in stable condition.  She was observed in the clinic for 15 min was stable.     Follow up: Pt was instructed to call if fever, chills, heavy bleeding, severe cramping or foul smelling discharge. May take 600 mg ibuprofen QID prn for mild cramping.  She was advised to check the IUD strings monthly.  Recommended that she return in 1 month for IUD string check.    Jennifer was seen today for iud.    Diagnoses and all orders for this visit:    Encounter for removal and reinsertion of intrauterine contraceptive device (IUD)  -     Insertion of an IUD  -      Removal of an IUD    Encounter for IUD insertion    Screening for cervical cancer  -     Pap imaged thin layer screen with HPV - recommended age 30 - 65 years (select HPV order below)    Bree Marie MD

## 2017-12-21 NOTE — MR AVS SNAPSHOT
After Visit Summary   12/21/2017    Jennifer Craft    MRN: [id number]           Patient Information     Date Of Birth          1995        Visit Information        Provider Department      12/21/2017 3:00 PM Bree Castelan MD [facility]        Today's Diagnoses     Encounter for removal and reinsertion of intrauterine contraceptive device (IUD)    -  1    Encounter for IUD insertion        Screening for cervical cancer          Care Instructions    IUD AFTERCARE INSTRUCTIONS   Nothing in the vagina for 7 days  Ibuprofen 600 mg up to 4x daily as needed for cramping    1. Uterine cramping is common after IUD placement. You can help relieve the discomfort with heating pads, Tylenol (acetaminophen), Aspirin or Advil (ibuprofen). If your cramping becomes very painful, please call the clinic.     2. Irregular bleeding and spotting is normal for the first few months after the IUD is placed. In some cases, women may experience irregular bleeding or spotting for up to six months after the IUD is placed. This bleeding can be annoying at first but usually will become lighter with the Mirena IUD quickly. Call the clinic if your bleeding is excessive and not getting better.     3. Your period will likely be shorter and lighter with a Mirena IUD. Approximately 40% of women will stop having periods altogether with the Mirena IUD. Your period may be heavier and longer with the Paragard IUD.     4. IUDs do not protect against sexually transmitted infections including the AIDS virus (HIV), warts (HPV), gonorrhea, Chlamydia, and herpes. Condoms should be used to decrease the risk sexually transmitted infections. If you think that you have been exposed to a sexually transmitted infection, please call the clinic.     5. If you had the IUD placed for birth control, the Paragard IUD is effective immediately. The Mirena IUD is effective immediately if it was inserted within seven days  after the start of your period. If you have Mirena inserted at any other time during your menstrual cycle, use another method of birth control, like condoms for at least 7 days.     6. It is possible for the IUD to come out of the uterus. If it does slip out of place, it is most likely to happen in the first few months after being put in. To make sure your IUD is in place, you can feel for the IUD strings between periods. To check for strings, wash your hands. Then, sit or squat down. Place one finger into your vagina until you feel your cervix. It will feel hard and rubbery, like the end of your nose. The string ends should be coming through your cervix. Do not pull on the strings. If the strings feel much longer than before, if you feel the hard plastic part of the IUD, or if you cannot feel the strings at all, the IUD may have moved out of place. Please call the clinic and consider using a back up form of birth control until you are seen.     7. Keep your follow-up appointment for 4-6 weeks after the IUD has been placed.     8. Pregnancy is unlikely after IUD placement, but can happen. If you have early pregnancy symptoms like nausea and vomiting, breast tenderness, frequent urination or abdominal pain, you can take a pregnancy test. Please call the clinic if you have any concerns or if your pregnancy test is positive.     9. The IUD should only be removed by a healthcare provider.     The Mirena IUD should be removed and/or replaced after 5 years.     The Paragard IUD should be removed and/or replaced after 10 years.     Warning Signs   Call the clinic if any of the following occurs:       Severe abdominal pain or cramping       Unusual bleeding       Fever or chills       Foul smelling vaginal discharge       Painful intercourse       Positive pregnancy test.                       Follow-ups after your visit        Who to contact     Please call your clinic at [phone] to:    Ask questions about your  health    Make or cancel appointments    Discuss your medicines    Learn about your test results    Speak to your doctor   If you have compliments or concerns about an experience at your clinic, or if you wish to file a complaint, please contact [facility] Patient Relations at [phone] or email us at [email or web address]         Additional Information About Your Visit        Nasseohart Information     Nasseohart gives you secure access to your electronic health record. If you see a primary care provider, you can also send messages to your care team and make appointments. If you have questions, please call your primary care clinic.  If you do not have a primary care provider, please call [phone] and they will assist you.      ClearMyMail is an electronic gateway that provides easy, online access to your medical records. With ClearMyMail, you can request a clinic appointment, read your test results, renew a prescription or communicate with your care team.     To access your existing account, please contact your [facility] or call [phone] for assistance.        Care EveryWhere ID     This is your Care EveryWhere ID. This could be used by other organizations to access your [facility] medical records  [id number]        Your Vitals Were     Pulse Breastfeeding?                62 No           Blood Pressure from Last 3 Encounters:   12/21/17 111/72   09/14/17 120/79   03/16/17 128/83    Weight from Last 3 Encounters:   09/14/17 117 kg (258 lb)   03/16/17 114.3 kg (252 lb)   12/31/14 97.1 kg (214 lb) (98 %)*     * Growth percentiles are based on CDC 2-20 Years data.              We Performed the Following     Insertion of an IUD     Pap imaged thin layer screen with HPV - recommended age 30 - 65 years (select HPV order below)     Removal of an IUD          Today's Medication Changes          These changes are accurate as of: 12/21/17  3:22 PM.  If you have  any questions, ask your nurse or doctor.               These medicines have changed or have updated prescriptions.        Dose/Directions    levonorgestrel 20 MCG/24HR IUD   Commonly known as:  MIRENA   This may have changed:    - additional instructions  - Another medication with the same name was removed. Continue taking this medication, and follow the directions you see here.   Used for:  Encounter for IUD insertion   Changed by:  Bree Castelan MD        Dose:  1 each   1 each (20 mcg) by Intrauterine route once for 1 dose Inserted 12/21/17 NDC 29241-006-98 Lot [id number] Exp 2/20   Quantity:  1 each   Refills:  0                Primary Care Provider Office Phone # Fax #    Bree Marie [phone]       [address] [place] [address] [address] [place] [id number]        Equal Access to Services     [facility] CenterISMAEL : Hadii brad burger hadasho Soomaali, waaxda luqadaha, qaybta kaalmada adeegyada, delfino al . So [facility] [phone].    ATENCIÓN: Si habla español, tiene a wolff disposición servicios gratuitos de asistencia lingüística. [facility] [phone].    We comply with applicable federal civil rights laws and [place] laws. We do not discriminate on the basis of race, color, national origin, age, disability, sex, sexual orientation, or gender identity.            Thank you!     Thank you for choosing [facility]  for your care. Our goal is always to provide you with excellent care. Hearing back from our patients is one way we can continue to improve our services. Please take a few minutes to complete the written survey that you may receive in the mail after your visit with us. Thank you!             Your Updated Medication List - Protect others around you: Learn how to safely use, store and throw away your medicines at www.disposemymeds.org.          This list is accurate as of: 12/21/17  3:22 PM.  Always use your most recent med list.                    Brand Name Dispense Instructions for use Diagnosis    FLUoxetine 40 MG capsule    PROzac    90 capsule    Take 1 capsule (40 mg) by mouth daily    Depression with anxiety       levonorgestrel 20 MCG/24HR IUD    MIRENA    1 each    1 each (20 mcg) by Intrauterine route once for 1 dose Inserted 12/21/17 NDC 17101-130-80 Lot [id number] Exp 2/20    Encounter for IUD insertion

## 2017-12-21 NOTE — PATIENT INSTRUCTIONS
IUD AFTERCARE INSTRUCTIONS   Nothing in the vagina for 7 days  Ibuprofen 600 mg up to 4x daily as needed for cramping    1. Uterine cramping is common after IUD placement. You can help relieve the discomfort with heating pads, Tylenol (acetaminophen), Aspirin or Advil (ibuprofen). If your cramping becomes very painful, please call the clinic.     2. Irregular bleeding and spotting is normal for the first few months after the IUD is placed. In some cases, women may experience irregular bleeding or spotting for up to six months after the IUD is placed. This bleeding can be annoying at first but usually will become lighter with the Mirena IUD quickly. Call the clinic if your bleeding is excessive and not getting better.     3. Your period will likely be shorter and lighter with a Mirena IUD. Approximately 40% of women will stop having periods altogether with the Mirena IUD. Your period may be heavier and longer with the Paragard IUD.     4. IUDs do not protect against sexually transmitted infections including the AIDS virus (HIV), warts (HPV), gonorrhea, Chlamydia, and herpes. Condoms should be used to decrease the risk sexually transmitted infections. If you think that you have been exposed to a sexually transmitted infection, please call the clinic.     5. If you had the IUD placed for birth control, the Paragard IUD is effective immediately. The Mirena IUD is effective immediately if it was inserted within seven days after the start of your period. If you have Mirena inserted at any other time during your menstrual cycle, use another method of birth control, like condoms for at least 7 days.     6. It is possible for the IUD to come out of the uterus. If it does slip out of place, it is most likely to happen in the first few months after being put in. To make sure your IUD is in place, you can feel for the IUD strings between periods. To check for strings, wash your hands. Then, sit or squat down. Place one finger  into your vagina until you feel your cervix. It will feel hard and rubbery, like the end of your nose. The string ends should be coming through your cervix. Do not pull on the strings. If the strings feel much longer than before, if you feel the hard plastic part of the IUD, or if you cannot feel the strings at all, the IUD may have moved out of place. Please call the clinic and consider using a back up form of birth control until you are seen.     7. Keep your follow-up appointment for 4-6 weeks after the IUD has been placed.     8. Pregnancy is unlikely after IUD placement, but can happen. If you have early pregnancy symptoms like nausea and vomiting, breast tenderness, frequent urination or abdominal pain, you can take a pregnancy test. Please call the clinic if you have any concerns or if your pregnancy test is positive.     9. The IUD should only be removed by a healthcare provider.     The Mirena IUD should be removed and/or replaced after 5 years.     The Paragard IUD should be removed and/or replaced after 10 years.     Warning Signs   Call the clinic if any of the following occurs:       Severe abdominal pain or cramping       Unusual bleeding       Fever or chills       Foul smelling vaginal discharge       Painful intercourse       Positive pregnancy test.

## 2017-12-27 LAB
COPATH REPORT: NORMAL
PAP: NORMAL

## 2018-08-23 ENCOUNTER — OFFICE VISIT (OUTPATIENT)
Dept: INTERNAL MEDICINE | Facility: CLINIC | Age: 23
End: 2018-08-23
Payer: COMMERCIAL

## 2018-08-23 VITALS
RESPIRATION RATE: 16 BRPM | HEART RATE: 59 BPM | WEIGHT: 266.5 LBS | BODY MASS INDEX: 43.67 KG/M2 | DIASTOLIC BLOOD PRESSURE: 71 MMHG | SYSTOLIC BLOOD PRESSURE: 106 MMHG

## 2018-08-23 DIAGNOSIS — F41.8 DEPRESSION WITH ANXIETY: ICD-10-CM

## 2018-08-23 RX ORDER — FLUOXETINE 40 MG/1
40 CAPSULE ORAL DAILY
Qty: 90 CAPSULE | Refills: 3 | Status: SHIPPED | OUTPATIENT
Start: 2018-08-23 | End: 2019-08-20

## 2018-08-23 ASSESSMENT — PAIN SCALES - GENERAL: PAINLEVEL: NO PAIN (0)

## 2018-08-23 NOTE — NURSING NOTE
Chief Complaint   Patient presents with     Recheck Medication     pt is here for a medication follow up        Mary Leon CMA at 9:43 AM on 8/23/2018

## 2018-08-23 NOTE — PATIENT INSTRUCTIONS
Primary Care Center Phone Number [phone]  Primary Care Center Medication Refill Request Information:  * Please contact your pharmacy regarding ANY request for medication refills.  ** [facility] Prescription Fax = [phone]  * Please allow 3 business days for routine medication refills.  * Please allow 5 business days for controlled substance medication refills.     Primary Care Center Test Result notification information:  *You will be notified with in 7-10 days of your appointment day regarding the results of your test.  If you are on MyChart you will be notified as soon as the provider has reviewed the results and signed off on them.             [facility]         Internal Medicine Resident                   Continuity Clinic    Who We Are    Resident Continuity Clinic is a part of the [facility].  Resident physicians see patients independently and establish a relationship with them over the course of their three-year residency program.  As with the Primary Care Clinic, our Resident Continuity Clinic models a group practice.  If your doctor is not available, you will be able to see another resident physician.  At the end of a resident s training, patients will be transitioned to a new resident physician for ongoing care.     We treat patients with a wide array of medical needs from routine physicals, to acute illnesses, to diabetes and blood pressure management, to complex medical illness.  What is a Resident Physician?    Resident physicians hold medical degrees and are doctors. They are training to become specialists in Internal Medicine. They work under the supervision of board-certified faculty physicians.  Expectations for Your Care    We strive to provide accessible, quality care at all times.    In order to provide this care, it is best to see your primary care resident doctor consistently rather switching between providers.  In the event you do see another physician, you  should schedule a follow-up visit with your usual primary care doctor.    If you are transitioning your care from another clinic, it is helpful to have your records available for your doctor to review.    We do not prescribe controlled substances, such as ADD medications or narcotic pain medications, on your first visit.  We will review your health records and concerns prior to devising a treatment plan with you in order to provide the best care.      Clinic Services     Extended clinic hours; patient  to help navigate your visit;  parking; laboratory and imaging services with evening and weekend hours    Multiple medical and surgical specialties in one building    Complementary services, including Nutrition, Integrative Medicine, Pharmacy consultations, Mental and Behavioral Health, Sports Medicine and Physical Therapy    Thank You    We would like to thank you for choosing the [facility] Internal Medicine Resident Continuity Clinic for your primary care. You are making a priceless contribution to the training of the next generation of health care practitioners.     Contact us at [phone] for appointments or questions.    Resident Clinic Hours are Tuesdays and Thursdays, 7:30am-5:00pm    Residents  Mary Simmons MD   (Female )   Mraquita Can MD   (Female)   Jb Mares MD  (Male)   Constantin Martinez MD  (Male)   Perry Brar MD   (Female)   Olayinka Robbins MD  (Male)    Pradeep Espino MD  (Male)   Guilherme Thayer MD  (Male)   Constantin Jasmine MD (Male)   Ben Kumari MD  (Male)   Mary Lou Le MD (Female)    Caty Hicks MD (Female)   Verena Ramirez MD  (Male)   Nette Landeros MD(Female)   Eliana Dietz MD  (Female)    Supervising Physicians   MD Bree Medeiros MD Briar Duffy, MD James Langland, MD Mary Logeais, MD Tanya Melnik, MD Charles Moldow, MD Heather Thompson Buum, MD Kathleen Watson, MD              .

## 2018-08-23 NOTE — MR AVS SNAPSHOT
After Visit Summary   8/23/2018    Jennifer Craft    MRN: [id number]           Patient Information     Date Of Birth          1995        Visit Information        Provider Department      8/23/2018 9:45 AM Marquita Can MD [facility]        Today's Diagnoses     Depression with anxiety          Care Instructions    Primary Care Center Phone Number [phone]  Primary Care Center Medication Refill Request Information:  * Please contact your pharmacy regarding ANY request for medication refills.  ** [facility] Prescription Fax = [phone]  * Please allow 3 business days for routine medication refills.  * Please allow 5 business days for controlled substance medication refills.     Primary Care Center Test Result notification information:  *You will be notified with in 7-10 days of your appointment day regarding the results of your test.  If you are on MyChart you will be notified as soon as the provider has reviewed the results and signed off on them.             [facility]         Internal Medicine Resident                   Continuity Clinic    Who We Are    Resident Continuity Clinic is a part of the [facility].  Resident physicians see patients independently and establish a relationship with them over the course of their three-year residency program.  As with the Primary Care Clinic, our Resident Continuity Clinic models a group practice.  If your doctor is not available, you will be able to see another resident physician.  At the end of a resident s training, patients will be transitioned to a new resident physician for ongoing care.     We treat patients with a wide array of medical needs from routine physicals, to acute illnesses, to diabetes and blood pressure management, to complex medical illness.  What is a Resident Physician?    Resident physicians hold medical degrees and are doctors. They are training to become specialists in  Internal Medicine. They work under the supervision of board-certified faculty physicians.  Expectations for Your Care    We strive to provide accessible, quality care at all times.    In order to provide this care, it is best to see your primary care resident doctor consistently rather switching between providers.  In the event you do see another physician, you should schedule a follow-up visit with your usual primary care doctor.    If you are transitioning your care from another clinic, it is helpful to have your records available for your doctor to review.    We do not prescribe controlled substances, such as ADD medications or narcotic pain medications, on your first visit.  We will review your health records and concerns prior to devising a treatment plan with you in order to provide the best care.      Clinic Services     Extended clinic hours; patient  to help navigate your visit;  parking; laboratory and imaging services with evening and weekend hours    Multiple medical and surgical specialties in one building    Complementary services, including Nutrition, Integrative Medicine, Pharmacy consultations, Mental and Behavioral Health, Sports Medicine and Physical Therapy    Thank You    We would like to thank you for choosing the [facility] Internal Medicine Resident Continuity Clinic for your primary care. You are making a priceless contribution to the training of the next generation of health care practitioners.     Contact us at [phone] for appointments or questions.    Resident Clinic Hours are Tuesdays and Thursdays, 7:30am-5:00pm    Residents  Mary Simmons MD   (Female )   Marquita Can MD   (Female)   Jb Mares MD  (Male)   Constantin Martinez MD  (Male)   Perry Brar MD   (Female)   Olayinka Robbins MD  (Male)    Pradeep Espino MD  (Male)   Guilherme Thayer MD  (Male)   Constantin Jasmine MD (Male)   Ben Kumari MD  (Male)   Mary Lou Le MD (Female)     Caty Hicks MD (Female)   Verena Ramirez MD  (Male)   Nette Landeros MD(Female)   Eliana Dietz MD  (Female)    Supervising Physicians   MD Bree Medeiros, MD Edilberto Hicks MD Mary Logeais, MD Tanya Melnik, MD Jeanie Morales MD Kathleen Watson, MD              .          Follow-ups after your visit        Who to contact     Please call your clinic at [phone] to:    Ask questions about your health    Make or cancel appointments    Discuss your medicines    Learn about your test results    Speak to your doctor            Additional Information About Your Visit        [employer] Information     [employer] gives you secure access to your electronic health record. If you see a primary care provider, you can also send messages to your care team and make appointments. If you have questions, please call your primary care clinic.  If you do not have a primary care provider, please call [phone] and they will assist you.      [employer] is an electronic gateway that provides easy, online access to your medical records. With [employer], you can request a clinic appointment, read your test results, renew a prescription or communicate with your care team.     To access your existing account, please contact your [facility] or call [phone] for assistance.        Care EveryWhere ID     This is your Care EveryWhere ID. This could be used by other organizations to access your [facility] medical records  [id number]        Your Vitals Were     Pulse Respirations Breastfeeding? BMI (Body Mass Index)          59 16 No 43.67 kg/m2         Blood Pressure from Last 3 Encounters:   08/23/18 106/71   12/21/17 111/72   09/14/17 120/79    Weight from Last 3 Encounters:   08/23/18 120.9 kg (266 lb 8 oz)   09/14/17 117 kg (258 lb)   03/16/17 114.3 kg (252 lb)              Today, you had the following     No orders  found for display       Primary Care Provider Office Phone # Fax #    Bree Nirmala Cliff Marie [phone]       [address] [place] [phone]  [facility] [address]        Equal Access to Services     CHARLENE FAIRBANKS : Dia burger aryao Socarlyali, waaxda luqadaha, qaybta kaalmada adeegyada, delfino deein hayaan madaisamaria cristobal servando jade. So [facility] [phone].    ATENCIÓN: Si habla español, tiene a wolff disposición servicios gratuitos de asistencia lingüística. Llame al [phone].    We comply with applicable federal civil rights laws and [place] laws. We do not discriminate on the basis of race, color, national origin, age, disability, sex, sexual orientation, or gender identity.            Thank you!     Thank you for choosing [facility]  for your care. Our goal is always to provide you with excellent care. Hearing back from our patients is one way we can continue to improve our services. Please take a few minutes to complete the written survey that you may receive in the mail after your visit with us. Thank you!             Your Updated Medication List - Protect others around you: Learn how to safely use, store and throw away your medicines at www.disposemymeds.org.          This list is accurate as of 8/23/18 10:14 AM.  Always use your most recent med list.                   Brand Name Dispense Instructions for use Diagnosis    FLUoxetine 40 MG capsule    PROzac    90 capsule    Take 1 capsule (40 mg) by mouth daily    Depression with anxiety       levonorgestrel 20 MCG/24HR IUD    MIRENA    1 each    1 each (20 mcg) by Intrauterine route once for 1 dose Inserted 12/21/17 NDC 08173-197-05 Lot [id number] Exp 2/20    Encounter for IUD insertion

## 2018-08-23 NOTE — PROGRESS NOTES
PRIMARY CARE CENTER       SUBJECTIVE:  Jennifer Craft is a 23 year old female with a PMHx of depression and anxiety presenting for follow-up.  She notes that her mood has been very well controlled.  She feels the medication has been very helpful in regulating her emotions and making a more manageable.  She denies SI.  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  She goes to school in [place] and is excited to go back in about a week to start classes again.  At her last visit she had her IUD replaced and things have been going well since.  She has menstrual periods that are regular about once every 28 days.  She notices a little bit worsening of her cramps since IUD replacement but overall is doing well with no concerns.  She inquires about hepatitis A vaccination given her work had recommended this to its place.  Denies fatigue, shortness of breath, chest pain, palpitations, upset stomach, weakness or other concerns today.    Past medical, and social histories as well as medications and allergies reviewed by me today.     ROS:   Constitutional, neuro, ENT, endocrine, pulmonary, cardiac, gastrointestinal, genitourinary, musculoskeletal, integument and psychiatric systems are negative, except as otherwise noted.    OBJECTIVE:    /71  Pulse 59  Resp 16  Wt 120.9 kg (266 lb 8 oz)  Breastfeeding? No  BMI 43.67 kg/m2   Wt Readings from Last 1 Encounters:   08/23/18 120.9 kg (266 lb 8 oz)       GENERAL APPEARANCE: healthy, alert and no distress     EYES: EOMI, PERRL     HENT: ear canals and TM's normal and mouth without ulcers or lesions     NECK: no adenopathy, no asymmetry, masses, or scars and thyroid normal to palpation     RESP: lungs clear to auscultation - no rales, rhonchi or wheezes     CV: regular rates and rhythm,no murmur, click or rub     ABDOMEN:  soft, nontender, nondistended, bowel sounds normal     MS:  FROM in all extremities.  No  deformities noted.     SKIN: no suspicious lesions or rashes     NEURO: Normal strength and tone, sensory exam grossly normal, mentation intact and speech normal     PSYCH: mentation appears normal. and affect normal/bright     ASSESSMENT/PLAN:    Jennifer Craft is a 23 year old female with a PMHx of depression and anxiety presenting for follow-up.     Depression with anxiety  History of anxiety depression, currently taking 40 mg fluoxetine daily.  She denies any adverse events from the medication, is tolerating it well and seeing great benefit from this medication.  States depression and anxiety are very well controlled, endorses happy mood and denies SI.  No medication changes at this time will give refills and plan to follow-up in 1 year.  -     FLUoxetine (PROZAC) 40 MG capsule; Take 1 capsule (40 mg) by mouth daily     Hep A  Patient inquires about hepatitis A vaccine.  She has received this vaccine previously and is up-to-date on all immunizations at this time.  Reassured no need for intervention at this time.      Pt should return to clinic for f/u in 1 year or as needed.    Marquita Can MD  Aug 23, 2018    Pt  and plan of care discussed with Dr. Starks  While the patient was in clinic, I reviewed the pertinent medical history and results.  I discussed the current findings on physical examination, as well as the patient s diagnosis and treatment plan with the resident and agree with the information as documented with the following exceptions: none.  Edilberto Starks

## 2019-08-20 ENCOUNTER — OFFICE VISIT (OUTPATIENT)
Dept: INTERNAL MEDICINE | Facility: CLINIC | Age: 24
End: 2019-08-20
Payer: COMMERCIAL

## 2019-08-20 VITALS
SYSTOLIC BLOOD PRESSURE: 115 MMHG | WEIGHT: 277.2 LBS | HEART RATE: 62 BPM | BODY MASS INDEX: 45.43 KG/M2 | DIASTOLIC BLOOD PRESSURE: 75 MMHG | RESPIRATION RATE: 17 BRPM | OXYGEN SATURATION: 98 % | TEMPERATURE: 98 F

## 2019-08-20 DIAGNOSIS — F41.8 DEPRESSION WITH ANXIETY: ICD-10-CM

## 2019-08-20 RX ORDER — PSEUDOEPHEDRINE HCL 30 MG
TABLET ORAL EVERY 4 HOURS PRN
COMMUNITY

## 2019-08-20 RX ORDER — IBUPROFEN 200 MG
200 TABLET ORAL EVERY 4 HOURS PRN
COMMUNITY

## 2019-08-20 RX ORDER — FLUOXETINE 40 MG/1
40 CAPSULE ORAL DAILY
Qty: 90 CAPSULE | Refills: 3 | Status: SHIPPED | OUTPATIENT
Start: 2019-08-20 | End: 2020-08-31

## 2019-08-20 ASSESSMENT — ENCOUNTER SYMPTOMS
HEADACHES: 0
RECTAL PAIN: 0
BOWEL INCONTINENCE: 0
CHILLS: 0
BREAST PAIN: 0
DIARRHEA: 0
EXTREMITY NUMBNESS: 0
SORE THROAT: 0
NECK MASS: 0
DECREASED LIBIDO: 0
TINGLING: 0
MUSCLE WEAKNESS: 0
TREMORS: 0
SWOLLEN GLANDS: 0
ALTERED TEMPERATURE REGULATION: 0
NIGHT SWEATS: 0
COUGH DISTURBING SLEEP: 0
NAUSEA: 0
COUGH: 0
LEG SWELLING: 0
FLANK PAIN: 0
DEPRESSION: 0
EYE IRRITATION: 0
HEMOPTYSIS: 0
LIGHT-HEADEDNESS: 0
PALPITATIONS: 0
HYPOTENSION: 0
EYE WATERING: 0
DIFFICULTY URINATING: 0
TACHYCARDIA: 0
SHORTNESS OF BREATH: 0
SMELL DISTURBANCE: 0
PARALYSIS: 0
STIFFNESS: 1
WHEEZING: 0
EXERCISE INTOLERANCE: 0
DOUBLE VISION: 0
TASTE DISTURBANCE: 0
POSTURAL DYSPNEA: 0
NUMBNESS: 0
RESPIRATORY PAIN: 0
INCREASED ENERGY: 0
POLYPHAGIA: 0
DECREASED CONCENTRATION: 0
SPEECH CHANGE: 0
SINUS PAIN: 0
WEIGHT LOSS: 0
FEVER: 0
HEARTBURN: 0
HOT FLASHES: 0
SYNCOPE: 0
BLOATING: 0
CLAUDICATION: 0
EYE PAIN: 0
DIZZINESS: 0
DECREASED APPETITE: 0
MUSCLE CRAMPS: 0
NECK PAIN: 1
POLYDIPSIA: 0
MEMORY LOSS: 0
SLEEP DISTURBANCES DUE TO BREATHING: 0
NAIL CHANGES: 0
LEG PAIN: 0
BRUISES/BLEEDS EASILY: 0
SPUTUM PRODUCTION: 0
SEIZURES: 0
JOINT SWELLING: 0
WEIGHT GAIN: 0
NERVOUS/ANXIOUS: 0
ORTHOPNEA: 0
JAUNDICE: 0
EYE REDNESS: 0
INSOMNIA: 0
HALLUCINATIONS: 0
CONSTIPATION: 0
SKIN CHANGES: 0
SNORES LOUDLY: 0
DISTURBANCES IN COORDINATION: 0
RECTAL BLEEDING: 0
HYPERTENSION: 0
WEAKNESS: 0
BACK PAIN: 1
FATIGUE: 0
SINUS CONGESTION: 0
TROUBLE SWALLOWING: 0
DYSURIA: 0
ARTHRALGIAS: 0
HEMATURIA: 0
PANIC: 0
MYALGIAS: 0
HOARSE VOICE: 0
BLOOD IN STOOL: 0
ABDOMINAL PAIN: 0
BREAST MASS: 0
VOMITING: 0
DYSPNEA ON EXERTION: 0
LOSS OF CONSCIOUSNESS: 0
POOR WOUND HEALING: 0

## 2019-08-20 ASSESSMENT — ANXIETY QUESTIONNAIRES
6. BECOMING EASILY ANNOYED OR IRRITABLE: SEVERAL DAYS
5. BEING SO RESTLESS THAT IT IS HARD TO SIT STILL: NOT AT ALL
2. NOT BEING ABLE TO STOP OR CONTROL WORRYING: NOT AT ALL
GAD7 TOTAL SCORE: 3
3. WORRYING TOO MUCH ABOUT DIFFERENT THINGS: NOT AT ALL
IF YOU CHECKED OFF ANY PROBLEMS ON THIS QUESTIONNAIRE, HOW DIFFICULT HAVE THESE PROBLEMS MADE IT FOR YOU TO DO YOUR WORK, TAKE CARE OF THINGS AT HOME, OR GET ALONG WITH OTHER PEOPLE: NOT DIFFICULT AT ALL
1. FEELING NERVOUS, ANXIOUS, OR ON EDGE: SEVERAL DAYS
7. FEELING AFRAID AS IF SOMETHING AWFUL MIGHT HAPPEN: NOT AT ALL

## 2019-08-20 ASSESSMENT — PATIENT HEALTH QUESTIONNAIRE - PHQ9
SUM OF ALL RESPONSES TO PHQ QUESTIONS 1-9: 1
5. POOR APPETITE OR OVEREATING: SEVERAL DAYS

## 2019-08-20 ASSESSMENT — PAIN SCALES - GENERAL: PAINLEVEL: MODERATE PAIN (4)

## 2019-08-20 NOTE — NURSING NOTE
Administered Tdap vaccine (see Immunizations in Chart Review). Patient tolerated well.      Randy Prather CMA at 10:03 AM on 8/20/2019

## 2019-08-20 NOTE — PROGRESS NOTES
[facility]         HPI:        Jennifer Craft is a 24 year old female with history of depression, anxiety, and protein C deficiency who presents for routine physical and inquires about need for PAP today.      Since Jennifer was last seen in clinic, she has been doing well. She has anxiety and depression, and says that her mood continues to be good since she was started on Prozac 2 years ago. She feels that things are going well. She is engaged and is in school in [place] studying [employer]- cyber security. She says that she previously was studying biomedical engineering and that this was not a good fit for her and contributed to her depression, and that she has been much happier since making this change. She works part time as a  at a coffee shop. She gets mild back and neck pain after working long hours at the coffee shop but says that she manages this with ibuprofen and warm packs. She is engaged to be  to her fiance in November. She reports that weight has been stable and that she tries to maintain healthy diet and exercise. She has a history of protein C deficiency and knows that she has a higher predisposition to clotting, and says that she knows to stay hydrates and seek assistance if she develops any unusual shortness of breath, chest pain, pain or swelling in her legs.     Health maintenance:   PAP: last normal with neg HPV in December 2017, next due 2020  IUD since 2017, has regular periods  HPV vaccine: up to date  Tdap: last in 2009  Never smoker  Alcohol: only occasional, 1-2 drinks every month    PHQ-9 SCORE 3/31/2017 9/14/2017 8/20/2019   PHQ-9 Total Score 15 1 1     BUSHRA-7 SCORE 3/31/2017 9/14/2017 8/20/2019   Total Score 11 3 3       Today' PHQ 9 Reviewed and it is 1 unchanged    Today' BUSHRA-7 Reviewed and it is 3 unchanged    Problem, Medication and Allergy Lists were reviewed and are current.  Patient is an established patient of this clinic.         Review of Systems:     Review  of Systems     Constitutional:  Negative for fever, chills, weight loss, weight gain, fatigue, decreased appetite, night sweats, recent stressors, height gain, height loss, post-operative complications, incisional pain, hallucinations, increased energy, hyperactivity and confused.   HENT:  Negative for ear pain, hearing loss, tinnitus, nosebleeds, trouble swallowing, hoarse voice, mouth sores, sore throat, ear discharge, tooth pain, gum tenderness, taste disturbance, smell disturbance, hearing aid, bleeding gums, dry mouth, sinus pain, sinus congestion and neck mass.    Eyes:  Negative for double vision, pain, redness, eye pain, decreased vision, eye watering, eye bulging, eye dryness, flashing lights, spots, floaters, strabismus, tunnel vision, jaundice and eye irritation.   Respiratory:   Negative for cough, hemoptysis, sputum production, shortness of breath, wheezing, sleep disturbances due to breathing, snores loudly, respiratory pain, dyspnea on exertion, cough disturbing sleep and postural dyspnea.    Cardiovascular:  Negative for chest pain, dyspnea on exertion, palpitations, orthopnea, claudication, leg swelling, fingers/toes turn blue, hypertension, hypotension, syncope, history of heart murmur, chest pain on exertion, chest pain at rest, pacemaker, few scattered varicosities, leg pain, sleep disturbances due to breathing, tachycardia, light-headedness, exercise intolerance and edema.   Gastrointestinal:  Negative for heartburn, nausea, vomiting, abdominal pain, diarrhea, constipation, blood in stool, melena, rectal pain, bloating, hemorrhoids, bowel incontinence, jaundice, rectal bleeding, coffee ground emesis and change in stool.   Genitourinary:  Negative for bladder incontinence, dysuria, urgency, hematuria, flank pain, vaginal discharge, difficulty urinating, genital sores, dyspareunia, decreased libido, nocturia, voiding less frequently, arousal difficulty, abnormal vaginal bleeding, excessive  menstruation, menstrual changes, hot flashes, vaginal dryness and postmenopausal bleeding.   Musculoskeletal:  Positive for back pain, stiffness and neck pain. Negative for myalgias, joint swelling, arthralgias, muscle cramps, bone pain, muscle weakness and fracture.   Skin:  Negative for nail changes, itching, poor wound healing, rash, hair changes, skin changes, acne, warts, poor wound healing, scarring, flaky skin, Raynaud's phenomenon, sensitivity to sunlight and skin thickening.   Neurological:  Negative for dizziness, tingling, tremors, speech change, seizures, loss of consciousness, weakness, light-headedness, numbness, headaches, disturbances in coordination, extremity numbness, memory loss, difficulty walking and paralysis.   Endo/Heme:  Negative for anemia, swollen glands and bruises/bleeds easily.   Psychiatric/Behavioral:  Negative for depression, hallucinations, memory loss, decreased concentration, mood swings and panic attacks.    Breast:  Negative for breast discharge, breast mass, breast pain and nipple retraction.   Endocrine:  Negative for altered temperature regulation, polyphagia, polydipsia, unwanted hair growth and change in facial hair.    I have personally reviewed and updated the complete ROS on the day of the visit.      Patient Active Problem List   Diagnosis     Protein C deficiency (H)     Contraception     Past Medical History:   Diagnosis Date     Anxiety      Depression      Herpes 1996    Genitals, oral- no outbreak in 5 years     Meningitis 2 months old     Protein C deficiency (H)      Past Surgical History:   Procedure Laterality Date     NO HISTORY OF SURGERY       Family History   Problem Relation Age of Onset     Diabetes Mother 45        type 2     High cholesterol Mother      Thyroid Disease Mother      Blood Disease Father         protein c     Mental Illness Father      Diabetes Sister 9     Diabetes Maternal Grandfather 45        type 2     Prostate Cancer Maternal  Grandfather 68     Hypertension Maternal Grandfather      High cholesterol Maternal Grandfather      Diabetes Maternal Grandmother 45        type 2     Breast Cancer Maternal Grandmother 34     High cholesterol Maternal Grandmother      Obesity Maternal Grandmother      Diabetes Maternal Uncle 50        type 2     Cancer Paternal Grandfather 42        lung     High cholesterol Paternal Grandfather      Mental Illness Paternal Grandfather      Myocardial Infarction Paternal Grandmother 62     Blood Disease Brother         protein c     Asthma Brother      Social History     Socioeconomic History     Marital status: Single     Spouse name: Not on file     Number of children: Not on file     Years of education: Not on file     Highest education level: Bachelor's degree (e.g., BA, AB, BS)   Occupational History     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     Occupation: student     Comment: [employer]   Social Needs     Financial resource strain: Not on file     Food insecurity:     Worry: Not on file     Inability: Not on file     Transportation needs:     Medical: Not on file     Non-medical: Not on file   Tobacco Use     Smoking status: Never Smoker     Smokeless tobacco: Never Used   Substance and Sexual Activity     Alcohol use: Yes     Alcohol/week: 0.5 oz     Drug use: No     Sexual activity: Yes     Partners: Male     Birth control/protection: Condom, IUD   Lifestyle     Physical activity:     Days per week: Not on file     Minutes per session: Not on file     Stress: Not on file   Relationships     Social connections:     Talks on phone: Not on file     Gets together: Not on file     Attends Muslim service: Not on file     Active member of club or organization: Not on file     Attends meetings of clubs or organizations: Not on file     Relationship status: Not on file     Intimate partner violence:     Fear of current or ex partner: Not on file     Emotionally abused: Not on file     Physically  abused: Not on file     Forced sexual activity: Not on file   Other Topics Concern     Not on file   Social History Narrative    Patient lives in [place] with her fiance.     She is studying IT- at a college in [place].     Works part time as a  in a coffee shop.          Current Outpatient Medications   Medication Sig Dispense Refill     cetirizine HCl 10 MG CAPS Take 10 mg by mouth daily as needed       FLUoxetine (PROZAC) 40 MG capsule Take 1 capsule (40 mg) by mouth daily 90 capsule 3     ibuprofen (ADVIL/MOTRIN) 200 MG tablet Take 200 mg by mouth every 4 hours as needed for mild pain       levonorgestrel (MIRENA) 20 MCG/24HR IUD 1 each (20 mcg) by Intrauterine route once for 1 dose Inserted 12/21/17  NDC 18764-559-66  Lot [id number]  Exp 2/20 1 each 0     pseudoePHEDrine (SUDAFED) 30 MG tablet Take by mouth every 4 hours as needed for congestion       Allergies   Allergen Reactions     Nkda [No Known Drug Allergies]             Physical Exam:   /75 (BP Location: Right arm, Patient Position: Sitting, Cuff Size: Adult Large)   Pulse 62   Temp 98  F (36.7  C) (Oral)   Resp 17   Wt 125.7 kg (277 lb 3.2 oz)   SpO2 98%   Breastfeeding? No   BMI 45.43 kg/m    Body mass index is 45.43 kg/m .  Vitals were reviewed       GENERAL APPEARANCE: healthy, alert and no distress     EYES: EOMI, PERRL     HENT: ear canals and TM's normal and nose and mouth without ulcers or lesions     NECK: no adenopathy, no asymmetry, masses, or scars and thyroid normal to palpation     RESP: lungs clear to auscultation - no rales, rhonchi or wheezes     CV: regular rates and rhythm, normal S1 S2, no S3 or S4 and no murmur, click or rub     ABDOMEN:  soft, nontender, no HSM or masses and bowel sounds normal     MS: extremities normal- no gross deformities noted, no evidence of inflammation in joints, FROM in all extremities.     SKIN: no suspicious lesions or rashes     NEURO: Normal strength and tone, sensory  exam grossly normal, mentation intact and speech normal     PSYCH: mentation appears normal. and affect normal/bright     LYMPHATICS: No cervical adenopathy      Results:   No labs this visit.     Assessment and Plan   Jennifer Craft is a 24 year old female with history of depression, anxiety, and protein C deficiency who presents for routine physical and inquires about need for PAP today.      Diagnoses and all orders for this visit:    Routine physical    Depression with anxiety  History of anxiety and depression, currently taking 40 mg fluoxetine daily.  She denies any adverse events from the medication, is tolerating it well and seeing great benefit from this medication.  States depression and anxiety are very well controlled, endorses happy mood and denies SI.  No medication changes at this time will give refills and plan to follow-up in 1 year.  -     FLUoxetine (PROZAC) 40 MG capsule; Take 1 capsule (40 mg) by mouth daily    Health maintenance  -     TDAP VACCINE (BOOSTRIX) today  -     PAP: records reviewed, next due December 2020    Options for treatment and follow-up care were reviewed with the patient. Jennifer Craft engaged in the decision making process and verbalized understanding of the options discussed and agreed with the final plan.    Soheila Mitchell MD MPH, PGY-5  Internal Medicine  August 20, 2019    Pt was seen and plan of care discussed with attending physician Dr. SELIN Ray.       Teaching Physician Note:  I was present during the visit and the patient was seen and examined by me.   I discussed the case with the resident and agree with the note as documented by the resident with the following exceptions:  None.    Osiris Ray M.D.  Internal Medicine   pager [phone]

## 2019-08-20 NOTE — NURSING NOTE
Chief Complaint   Patient presents with     Physical       Randy Prather CMA (AAMA) at 9:38 AM on 8/20/2019

## 2019-08-21 ASSESSMENT — ANXIETY QUESTIONNAIRES: GAD7 TOTAL SCORE: 3

## 2019-11-04 ENCOUNTER — HEALTH MAINTENANCE LETTER (OUTPATIENT)
Age: 24
End: 2019-11-04

## 2020-08-20 ASSESSMENT — ANXIETY QUESTIONNAIRES
1. FEELING NERVOUS, ANXIOUS, OR ON EDGE: NEARLY EVERY DAY
GAD7 TOTAL SCORE: 11
2. NOT BEING ABLE TO STOP OR CONTROL WORRYING: NEARLY EVERY DAY
7. FEELING AFRAID AS IF SOMETHING AWFUL MIGHT HAPPEN: SEVERAL DAYS
7. FEELING AFRAID AS IF SOMETHING AWFUL MIGHT HAPPEN: SEVERAL DAYS
GAD7 TOTAL SCORE: 11
3. WORRYING TOO MUCH ABOUT DIFFERENT THINGS: SEVERAL DAYS
6. BECOMING EASILY ANNOYED OR IRRITABLE: SEVERAL DAYS
4. TROUBLE RELAXING: SEVERAL DAYS
5. BEING SO RESTLESS THAT IT IS HARD TO SIT STILL: SEVERAL DAYS

## 2020-08-21 ENCOUNTER — OFFICE VISIT (OUTPATIENT)
Dept: FAMILY MEDICINE | Facility: CLINIC | Age: 25
End: 2020-08-21
Payer: COMMERCIAL

## 2020-08-21 VITALS — HEART RATE: 79 BPM | DIASTOLIC BLOOD PRESSURE: 81 MMHG | SYSTOLIC BLOOD PRESSURE: 121 MMHG | OXYGEN SATURATION: 96 %

## 2020-08-21 DIAGNOSIS — F41.1 GENERALIZED ANXIETY DISORDER: ICD-10-CM

## 2020-08-21 DIAGNOSIS — F32.1 MODERATE MAJOR DEPRESSION (H): ICD-10-CM

## 2020-08-21 DIAGNOSIS — Z13.1 SCREENING FOR DIABETES MELLITUS: ICD-10-CM

## 2020-08-21 DIAGNOSIS — Z00.00 WELL ADULT EXAM: Primary | ICD-10-CM

## 2020-08-21 DIAGNOSIS — Z13.6 SCREENING FOR HEART DISEASE: ICD-10-CM

## 2020-08-21 ASSESSMENT — ANXIETY QUESTIONNAIRES
1. FEELING NERVOUS, ANXIOUS, OR ON EDGE: MORE THAN HALF THE DAYS
GAD7 TOTAL SCORE: 11
7. FEELING AFRAID AS IF SOMETHING AWFUL MIGHT HAPPEN: SEVERAL DAYS
6. BECOMING EASILY ANNOYED OR IRRITABLE: MORE THAN HALF THE DAYS
2. NOT BEING ABLE TO STOP OR CONTROL WORRYING: MORE THAN HALF THE DAYS
GAD7 TOTAL SCORE: 11
5. BEING SO RESTLESS THAT IT IS HARD TO SIT STILL: MORE THAN HALF THE DAYS
3. WORRYING TOO MUCH ABOUT DIFFERENT THINGS: SEVERAL DAYS

## 2020-08-21 ASSESSMENT — PATIENT HEALTH QUESTIONNAIRE - PHQ9
SUM OF ALL RESPONSES TO PHQ QUESTIONS 1-9: 6
5. POOR APPETITE OR OVEREATING: SEVERAL DAYS

## 2020-08-21 ASSESSMENT — PAIN SCALES - GENERAL: PAINLEVEL: NO PAIN (0)

## 2020-08-21 NOTE — NURSING NOTE
Chief Complaint   Patient presents with     Establish Care     Establish care.     Gyn Exam     Pap and possible vaccines     Depression Response    Patient completed the PHQ-9 assessment for depression and scored >9? No  Question 9 on the PHQ-9 was positive for suicidality? No    I personally notified the following: visit provider      NANCY Manning 11:41 AM  8/21/2020

## 2020-08-21 NOTE — PROGRESS NOTES
Female Physical Note          HPI    Concerns today:None      Patient Active Problem List   Diagnosis     Protein C deficiency (H)     Contraception       Past Medical History:   Diagnosis Date     Anxiety      Depression      Herpes 1996    Genitals, oral- no outbreak in 5 years     Meningitis 2 months old     Protein C deficiency (H)        Previous Medical Care        Family History   Problem Relation Age of Onset     Diabetes Mother 45        type 2     High cholesterol Mother      Thyroid Disease Mother      Blood Disease Father         protein c     Mental Illness Father      Diabetes Sister 9     Diabetes Maternal Grandfather 45        type 2     Prostate Cancer Maternal Grandfather 68     Hypertension Maternal Grandfather      High cholesterol Maternal Grandfather      Diabetes Maternal Grandmother 45        type 2     Breast Cancer Maternal Grandmother 34     High cholesterol Maternal Grandmother      Obesity Maternal Grandmother      Diabetes Maternal Uncle 50        type 2     Cancer Paternal Grandfather 42        lung     High cholesterol Paternal Grandfather      Mental Illness Paternal Grandfather      Myocardial Infarction Paternal Grandmother 62     Blood Disease Brother         protein c     Asthma Brother               Review of Systems:     Review of Systems:  CONSTITUTIONAL: NEGATIVE for fever, chills, change in weight  INTEGUMENTARY/SKIN: NEGATIVE for worrisome rashes, moles or lesions  EYES: NEGATIVE for vision changes or irritation  ENT/MOUTH: NEGATIVE for ear, mouth and throat problems  RESP: NEGATIVE for significant cough or SOB  BREAST: NEGATIVE for masses, tenderness or discharge  CV: NEGATIVE for chest pain, palpitations or peripheral edema  GI: NEGATIVE for nausea, abdominal pain, heartburn, or change in bowel habits  : NEGATIVE for frequency, dysuria, or hematuria  MUSCULOSKELETAL: NEGATIVE for significant arthralgias or myalgia  NEURO: NEGATIVE for weakness, dizziness or  paresthesias  ENDOCRINE: NEGATIVE for temperature intolerance, skin/hair changes  HEME/ALLERGY: NEGATIVE for bleeding problems  PSYCHIATRIC: NEGATIVE for changes in mood or affect  Sleep:   Do you snore most or the night (as reported by a family member)? No  Do you feel sleepy or extremely tired during most of the day? 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   Lifestyle     Physical activity     Days per week: Not on file     Minutes per session: Not on file     Stress: Not on file   Relationships     Social connections     Talks on phone: Not on file     Gets together: Not on file     Attends Restorationism service: Not on file     Active member of club or organization: Not on file     Attends meetings of clubs or organizations: Not on file     Relationship status: Not on file     Intimate partner violence     Fear of current or ex partner: Not on file     Emotionally abused: Not on file     Physically abused: Not on file     Forced sexual activity: Not on file   Other Topics Concern     Not on file   Social History Narrative    Patient lives in [place] with her fiance.     She is studying [email or web address]  security at a college in [place].     Works part time as a  in a coffee shop.            Marital Status:  Who lives in your household? Her , grandparents, mom.     Has anyone hurt you physically, for example by pushing, hitting, slapping or kicking you or forcing you to have sex? Denies  Do you feel threatened or controlled by a partner, ex-partner or anyone in your life? Denies    Sexual Health     Sexual concerns: No   STI History: Neg  Pregnancy History: No obstetric history on file.  LMP No LMP recorded. 1 month ago  Last Pap Smear Date: 2017  Lab Results   Component Value Date    PAP NIL 12/21/2017     Abnormal Pap History: None    Recommended Screening          Physical Exam:     Vitals: /81   Pulse 79   SpO2 96%   BMI= There is no height or weight on file to calculate BMI.   GENERAL: healthy, alert and no distress  EYES: Eyes grossly normal to inspection, extraocular movements - intact, and PERRL  HENT: ear canals- normal; TMs- normal; Nose- normal; Mouth- no ulcers, no lesions  NECK: no tenderness, no adenopathy, no asymmetry, no masses, no stiffness; thyroid- normal to palpation  RESP: lungs clear to auscultation - no rales, no rhonchi, no wheezes  BREAST: no masses, no tenderness, no nipple discharge, no palpable axillary masses or adenopathy  CV: regular rates and rhythm, normal S1 S2, no S3 or S4 and no murmur, no click or rub -  ABDOMEN: soft, no tenderness, no  hepatosplenomegaly, no masses, normal bowel sounds  MS: extremities- no gross deformities noted, no edema  SKIN: no suspicious lesions, no rashes  NEURO: strength and tone- normal, sensory exam- grossly normal, mentation- intact, speech- normal, reflexes- symmetric  BACK: no CVA tenderness, no paralumbar tenderness  - female: cervix- normal, adnexae- normal; uterus- normal, no masses, no discharge; IUD strings observed  RECTAL- female: no masses, no hemorrhoids  PSYCH: Alert and oriented times 3; speech- coherent ,  normal rate and volume; able to articulate logical thoughts, able to abstract reason, no tangential thoughts, no hallucinations or delusions, affect- normal  LYMPHATICS: ant. cervical- normal, post. cervical- normal, axillary- normal, supraclavicular- normal, inguinal- normal      Assessment and Plan      Jennifer was seen today for establish care and gyn exam.    Diagnoses and all orders for this visit:    Well adult exam  -     Pap imaged thin layer screen only - recommended age 21 - 24 years    Moderate major depression (H)    Generalized anxiety disorder    Screening for diabetes mellitus  -     Glucose; Future    Screening for heart disease  -     Lipid Profile FASTING; Future        1. Health Care Maintenance: Normal Physical Exam      1. Routine follow up in one year.  2.Contraception: IUD    Options for treatment and follow-up care were reviewed with the patient . Jennifer Craft and/or guardian engaged in the decision making process and verbalized understanding of the options discussed and agreed with the final plan.  NAT Coates, CNP    Answers for HPI/ROS submitted by the patient on 8/20/2020   BUSHRA 7 TOTAL SCORE: 11

## 2020-08-22 DIAGNOSIS — F41.8 DEPRESSION WITH ANXIETY: ICD-10-CM

## 2020-08-25 LAB
COPATH REPORT: NORMAL
PAP: NORMAL

## 2020-08-26 NOTE — TELEPHONE ENCOUNTER
FLUOXETINE HCL 40MG CAPS    Last Written Prescription Date:  8/20/2019  Last Fill Quantity: 90,   # refills: 3  Last Office Visit : 8/21/2020  Future Office visit:  9/2/2020    Routing refill request to provider for review/approval because:  Ok to continue same dose of med for Pt care?   Pt established care with Provider on 8/20/2020.   Refer to Provider for review and refills per Providers recommendation for Pt care.         Liz Soares RN  Central Triage Red Flags/Med Refills

## 2020-08-28 NOTE — TELEPHONE ENCOUNTER
Checking status- Pt is out of med- This request was initiated over 4 business days ago.    Thank you!  [facility]

## 2020-08-28 NOTE — PATIENT INSTRUCTIONS

## 2020-08-31 RX ORDER — FLUOXETINE 40 MG/1
40 CAPSULE ORAL DAILY
Qty: 90 CAPSULE | Refills: 0 | OUTPATIENT
Start: 2020-08-31

## 2020-11-22 ENCOUNTER — HEALTH MAINTENANCE LETTER (OUTPATIENT)
Age: 25
End: 2020-11-22

## 2021-05-31 VITALS — WEIGHT: 252.9 LBS | BODY MASS INDEX: 41.44 KG/M2

## 2021-06-12 NOTE — PROGRESS NOTES
ASSESSMENT:   1. Sinusitis         PLAN:  Upper respiratory infection with sinus component  LIkely viral at this time. If not improving in the next 3 days, or if getting significantly worse, you may start the antibiotic that has been prescribed. Recommend probiotics such as acidophillis and bifidus to replace the normal bacteria that lives in the colon and gets depleted by antibiotics. You can buy it as an over the counter supplement (Culturelle, Florajen) or eat yogurt with live or active cultures.  Push fluids, get extra rest  Recommend hot tea with lemon/honey, lozenges  Recommend hot steamy showers, saline nasal spray or a netti pot to relieve congestion  May use a cough suppressant or a cool mist humidifier to lessen cough  Return to clinic if symptoms are not improving as expected or if worsening in any way.       SUBJECTIVE:   Jennifer Craft is a 22 y.o. female presents today with cold symptoms for 1 weeks and has been waxing and waning. She has had dry cough, nasal congestion, rhinorrhea, chills, decreased appetite but denies myalgias, headache, fever, chills and SOB. Sick contacts: none. Has tried decongestant, oxana seltzer with relief. Nonsmoker. No hx of asthma.     No past medical history on file.    History   Smoking Status     Never Smoker   Smokeless Tobacco     Never Used       Current Medications:  Current Outpatient Prescriptions   Medication Sig Dispense Refill     amoxicillin-clavulanate (AUGMENTIN) 875-125 mg per tablet Take 1 tablet by mouth 2 (two) times a day for 10 days. 20 tablet 0     FLUoxetine (PROZAC) 20 MG capsule Take 20 mg by mouth daily.       No current facility-administered medications for this visit.        Allergies:   No Known Allergies    OBJECTIVE:   Vitals:    08/01/17 1331 08/01/17 1419   BP: 110/74    Pulse: (!) 105 100   Resp: 20    Temp: 98.6  F (37  C)    TempSrc: Oral    SpO2: 97%    Weight: (!) 252 lb 14.4 oz (114.7 kg)      Physical exam reveals a pleasant 22 y.o.  female.   Appears alert and cooperative.  Eyes:  LANDRY, EOMI  Ears:  normal TMs bilaterally and normal canals bilaterally  Nose:    Mucosa normal. Scant, clear rhinorrhea.septum midline, normal mucosa and clear rhinorrhea  Mouth:  Mucosa pink and moist.  mild erythema   Neck: normal, supple and no adenopathy  Sinuses: nontender with palpation  Lungs: Chest is clear, no wheezing or rales. Symmetric air entry throughout both lung fields.occasional dry cough  Heart: regular rate and rhythm, no murmur, rub or gallop

## 2021-09-15 ASSESSMENT — ENCOUNTER SYMPTOMS
DECREASED CONCENTRATION: 1
DECREASED APPETITE: 0
SINUS CONGESTION: 1
SORE THROAT: 0
FATIGUE: 1
HALLUCINATIONS: 0
POLYDIPSIA: 0
WEIGHT LOSS: 0
SMELL DISTURBANCE: 0
DEPRESSION: 1
BREAST MASS: 0
CHILLS: 0
NIGHT SWEATS: 0
PANIC: 1
BREAST PAIN: 0
NECK MASS: 0
POLYPHAGIA: 0
TASTE DISTURBANCE: 0
SINUS PAIN: 0
ALTERED TEMPERATURE REGULATION: 0
INCREASED ENERGY: 0
INSOMNIA: 0
NERVOUS/ANXIOUS: 1
TROUBLE SWALLOWING: 0
FEVER: 0
HOARSE VOICE: 0
WEIGHT GAIN: 1

## 2021-09-16 ENCOUNTER — LAB (OUTPATIENT)
Dept: LAB | Facility: CLINIC | Age: 26
End: 2021-09-16
Payer: COMMERCIAL

## 2021-09-16 ENCOUNTER — OFFICE VISIT (OUTPATIENT)
Dept: INTERNAL MEDICINE | Facility: CLINIC | Age: 26
End: 2021-09-16
Payer: COMMERCIAL

## 2021-09-16 VITALS
HEART RATE: 83 BPM | TEMPERATURE: 98.6 F | OXYGEN SATURATION: 97 % | DIASTOLIC BLOOD PRESSURE: 82 MMHG | BODY MASS INDEX: 48.97 KG/M2 | RESPIRATION RATE: 18 BRPM | SYSTOLIC BLOOD PRESSURE: 127 MMHG | WEIGHT: 293 LBS

## 2021-09-16 DIAGNOSIS — Z91.89 ENCOUNTER FOR HEPATITIS C VIRUS SCREENING TEST FOR HIGH RISK PATIENT: ICD-10-CM

## 2021-09-16 DIAGNOSIS — Z11.59 ENCOUNTER FOR HCV SCREENING TEST FOR LOW RISK PATIENT: ICD-10-CM

## 2021-09-16 DIAGNOSIS — Z11.59 ENCOUNTER FOR HEPATITIS C VIRUS SCREENING TEST FOR HIGH RISK PATIENT: ICD-10-CM

## 2021-09-16 DIAGNOSIS — F41.8 DEPRESSION WITH ANXIETY: Primary | ICD-10-CM

## 2021-09-16 DIAGNOSIS — Z23 NEED FOR IMMUNIZATION AGAINST INFLUENZA: ICD-10-CM

## 2021-09-16 DIAGNOSIS — Z11.4 SCREENING FOR HUMAN IMMUNODEFICIENCY VIRUS: ICD-10-CM

## 2021-09-16 DIAGNOSIS — E66.01 MORBID OBESITY (H): ICD-10-CM

## 2021-09-16 DIAGNOSIS — F41.8 DEPRESSION WITH ANXIETY: ICD-10-CM

## 2021-09-16 LAB
ALBUMIN SERPL-MCNC: 3.6 G/DL (ref 3.4–5)
ALP SERPL-CCNC: 83 U/L (ref 40–150)
ALT SERPL W P-5'-P-CCNC: 21 U/L (ref 0–70)
ANION GAP SERPL CALCULATED.3IONS-SCNC: 7 MMOL/L (ref 3–14)
AST SERPL W P-5'-P-CCNC: 11 U/L (ref 0–45)
BASOPHILS # BLD AUTO: 0.1 10E3/UL (ref 0–0.2)
BASOPHILS NFR BLD AUTO: 1 %
BILIRUB SERPL-MCNC: 0.4 MG/DL (ref 0.2–1.3)
BUN SERPL-MCNC: 10 MG/DL (ref 7–30)
CALCIUM SERPL-MCNC: 9.1 MG/DL (ref 8.5–10.1)
CHLORIDE BLD-SCNC: 107 MMOL/L (ref 94–109)
CHOLEST SERPL-MCNC: 219 MG/DL
CO2 SERPL-SCNC: 25 MMOL/L (ref 20–32)
CREAT SERPL-MCNC: 0.8 MG/DL (ref 0.52–1.25)
EOSINOPHIL # BLD AUTO: 0.3 10E3/UL (ref 0–0.7)
EOSINOPHIL NFR BLD AUTO: 2 %
ERYTHROCYTE [DISTWIDTH] IN BLOOD BY AUTOMATED COUNT: 14.5 % (ref 10–15)
FASTING STATUS PATIENT QL REPORTED: YES
FERRITIN SERPL-MCNC: 45 NG/ML (ref 12–388)
GFR SERPL CREATININE-BSD FRML MDRD: >90 ML/MIN/1.73M2
GLUCOSE BLD-MCNC: 83 MG/DL (ref 70–99)
HCT VFR BLD AUTO: 42.5 % (ref 35–53)
HDLC SERPL-MCNC: 38 MG/DL
HGB BLD-MCNC: 13.3 G/DL (ref 11.7–17.7)
IMM GRANULOCYTES # BLD: 0 10E3/UL
IMM GRANULOCYTES NFR BLD: 0 %
LDLC SERPL CALC-MCNC: 147 MG/DL
LYMPHOCYTES # BLD AUTO: 2.7 10E3/UL (ref 0.8–5.3)
LYMPHOCYTES NFR BLD AUTO: 22 %
MCH RBC QN AUTO: 28.2 PG (ref 26.5–33)
MCHC RBC AUTO-ENTMCNC: 31.3 G/DL (ref 31.5–36.5)
MCV RBC AUTO: 90 FL (ref 78–100)
MONOCYTES # BLD AUTO: 0.8 10E3/UL (ref 0–1.3)
MONOCYTES NFR BLD AUTO: 7 %
NEUTROPHILS # BLD AUTO: 8.1 10E3/UL (ref 1.6–8.3)
NEUTROPHILS NFR BLD AUTO: 68 %
NONHDLC SERPL-MCNC: 181 MG/DL
NRBC # BLD AUTO: 0 10E3/UL
NRBC BLD AUTO-RTO: 0 /100
PLATELET # BLD AUTO: 333 10E3/UL (ref 150–450)
POTASSIUM BLD-SCNC: 3.7 MMOL/L (ref 3.4–5.3)
PROT SERPL-MCNC: 8.3 G/DL (ref 6.8–8.8)
RBC # BLD AUTO: 4.72 10E6/UL (ref 3.8–5.9)
SODIUM SERPL-SCNC: 139 MMOL/L (ref 133–144)
TRIGL SERPL-MCNC: 171 MG/DL
TSH SERPL DL<=0.005 MIU/L-ACNC: 2.63 MU/L (ref 0.4–4)
WBC # BLD AUTO: 12 10E3/UL (ref 4–11)

## 2021-09-16 PROCEDURE — 36415 COLL VENOUS BLD VENIPUNCTURE: CPT | Performed by: PATHOLOGY

## 2021-09-16 PROCEDURE — 90471 IMMUNIZATION ADMIN: CPT | Performed by: STUDENT IN AN ORGANIZED HEALTH CARE EDUCATION/TRAINING PROGRAM

## 2021-09-16 PROCEDURE — 87389 HIV-1 AG W/HIV-1&-2 AB AG IA: CPT | Mod: 90 | Performed by: PATHOLOGY

## 2021-09-16 PROCEDURE — 86803 HEPATITIS C AB TEST: CPT | Mod: 90 | Performed by: PATHOLOGY

## 2021-09-16 PROCEDURE — 99395 PREV VISIT EST AGE 18-39: CPT | Mod: GE | Performed by: STUDENT IN AN ORGANIZED HEALTH CARE EDUCATION/TRAINING PROGRAM

## 2021-09-16 PROCEDURE — 80061 LIPID PANEL: CPT | Performed by: PATHOLOGY

## 2021-09-16 PROCEDURE — 82306 VITAMIN D 25 HYDROXY: CPT | Mod: 90 | Performed by: PATHOLOGY

## 2021-09-16 PROCEDURE — 90686 IIV4 VACC NO PRSV 0.5 ML IM: CPT | Performed by: STUDENT IN AN ORGANIZED HEALTH CARE EDUCATION/TRAINING PROGRAM

## 2021-09-16 PROCEDURE — 80050 GENERAL HEALTH PANEL: CPT | Performed by: PATHOLOGY

## 2021-09-16 PROCEDURE — 82728 ASSAY OF FERRITIN: CPT | Performed by: PATHOLOGY

## 2021-09-16 RX ORDER — CITALOPRAM HYDROBROMIDE 40 MG/1
40 TABLET ORAL DAILY
Qty: 30 TABLET | Refills: 3 | Status: SHIPPED | OUTPATIENT
Start: 2021-09-16

## 2021-09-16 ASSESSMENT — ANXIETY QUESTIONNAIRES
7. FEELING AFRAID AS IF SOMETHING AWFUL MIGHT HAPPEN: MORE THAN HALF THE DAYS
3. WORRYING TOO MUCH ABOUT DIFFERENT THINGS: NEARLY EVERY DAY
IF YOU CHECKED OFF ANY PROBLEMS ON THIS QUESTIONNAIRE, HOW DIFFICULT HAVE THESE PROBLEMS MADE IT FOR YOU TO DO YOUR WORK, TAKE CARE OF THINGS AT HOME, OR GET ALONG WITH OTHER PEOPLE: VERY DIFFICULT
6. BECOMING EASILY ANNOYED OR IRRITABLE: MORE THAN HALF THE DAYS
5. BEING SO RESTLESS THAT IT IS HARD TO SIT STILL: SEVERAL DAYS
GAD7 TOTAL SCORE: 12
1. FEELING NERVOUS, ANXIOUS, OR ON EDGE: SEVERAL DAYS
2. NOT BEING ABLE TO STOP OR CONTROL WORRYING: MORE THAN HALF THE DAYS

## 2021-09-16 ASSESSMENT — PATIENT HEALTH QUESTIONNAIRE - PHQ9
SUM OF ALL RESPONSES TO PHQ QUESTIONS 1-9: 16
5. POOR APPETITE OR OVEREATING: SEVERAL DAYS

## 2021-09-16 ASSESSMENT — PAIN SCALES - GENERAL: PAINLEVEL: NO PAIN (0)

## 2021-09-16 NOTE — PROGRESS NOTES
PRIMARY CARE CENTER     SUBJECTIVE:  Jennifer Heller is a 26 year old adult with a PMHx of anxiety, depression, meningitis, protein C deficiency who presents to establish care and for depression.    She would like to talk about her fluoxetine today.  She has been taking it since 2018 and had been doing well on it, but now feels it is not really helping.  She has been missing doses, has been unmotivated and has struggled with feeling poorly.  She has not seen psychiatry before last seen in the fifth grade, has not had a regular therapist. She has had some lactation from her breast which she thinks might be related to fluoxetine use because it seems to go away when she is not taking fluoxetine.    She feels her sleep is poor in quality, does not feel as rested.  Does not have a lot of motivation or energy or interest.  Is not able to concentrate.  Sometimes becomes hyper fixated and worries about all the things that she is not doing and can go into a spiral.  And reports the fluoxetine does not help with this.  He has no thoughts of hurting herself.  She used to have passive suicidal ideation which resolved with the fluoxetine and has not come back.  No psychiatric hospitalizations.    Drinks 1-2 drinks of alcohol about a few times a month.  No smoking or other drug use.  Stressors are mostly herself feeling like she is not doing enough support system includes her , dogs she enjoys taking care of.     Depression Screening Follow Up    PHQ 9/16/2021   PHQ-9 Total Score 16   Q9: Thoughts of better off dead/self-harm past 2 weeks Not at all     Last PHQ-9 9/16/2021   1.  Little interest or pleasure in doing things 2   2.  Feeling down, depressed, or hopeless 2   3.  Trouble falling or staying asleep, or sleeping too much 2   4.  Feeling tired or having little energy 2   5.  Poor appetite or overeating 3   6.  Feeling bad about yourself 1   7.  Trouble concentrating 3   8.  Moving slowly  or restless 1   Q9: Thoughts of better off dead/self-harm past 2 weeks 0   PHQ-9 Total Score 16   Difficulty at work, home, or with people Very difficult     Current Outpatient Medications   Medication     cetirizine HCl 10 MG CAPS     FLUoxetine (PROZAC) 40 MG capsule     ibuprofen (ADVIL/MOTRIN) 200 MG tablet     levonorgestrel (MIRENA) 20 MCG/24HR IUD     pseudoePHEDrine (SUDAFED) 30 MG tablet     No current facility-administered medications for this visit.     Allergies   Allergen Reactions     Nkda [No Known Drug Allergies]      OBJECTIVE:  /82   Pulse 83   Temp 98.6  F (37  C) (Oral)   Resp 18   Wt 135.5 kg (298 lb 12.8 oz)   SpO2 97%   Breastfeeding No   BMI 48.97 kg/m     Wt Readings from Last 1 Encounters:   09/16/21 135.5 kg (298 lb 12.8 oz)     Physical Examination:  Gen: Sitting in chair, speaking slowly, in no acute distress  HEENT: EOMI, nonicteric  CV: regular rate, no murmurs auscultated  Neuro: alert, without focal deficits  Psych: Mood making eye contact, fluent speech, no SI    ASSESSMENT/PLAN:  Jennifer Heller is a 26 year old adult with a PMHx of anxiety, depression, meningitis, protein C deficiency who presents to \[facility]\"" care and for depression.    Depression with anxiety  Difficulty doing basic tasks appears her depression and mood has been worsening.  She feels the pandemic has made a lot of this more challenging.  She feels the fluoxetine is not working, is open to switching to citalopram and is open to seeing psychiatry.  Unclear if patient may have alternate diagnosis such as OCD given fixated thoughts, intrusive thoughts.   - Start citalopram 40 mg  - Stop taking fluoxetine  - CBC, CMP, TSH, vitamin D deficiency  - Mental health referral for psychiatry and psychotherapy    Health maintenance  We will discuss further at future visit. BMI is in the morbid obesity range  Patient lives in [place] half the time, will have to switch insurance in January.  - Flu vaccine  -  Up-to-date on COVID vaccine  - Up-to-date on Pap smear    Pt should return to clinic for f/u with me in 1 month.  Pt and plan of care discussed with Dr. Casas.    Deanna Santana MD  Sep 16, 2021  PGY-2, Internal Medicine

## 2021-09-16 NOTE — PATIENT INSTRUCTIONS
1. Stop taking fluoxetine  2. Start taking citalopram ( in pharmacy today)  3. See psychiatry and therapy   4. Follow up with me in 1 month

## 2021-09-17 LAB
DEPRECATED CALCIDIOL+CALCIFEROL SERPL-MC: 11 UG/L (ref 20–75)
HCV AB SERPL QL IA: NONREACTIVE
HIV 1+2 AB+HIV1 P24 AG SERPL QL IA: NONREACTIVE

## 2021-09-17 ASSESSMENT — ANXIETY QUESTIONNAIRES: GAD7 TOTAL SCORE: 12

## 2021-10-21 ENCOUNTER — OFFICE VISIT (OUTPATIENT)
Dept: INTERNAL MEDICINE | Facility: CLINIC | Age: 26
End: 2021-10-21
Payer: COMMERCIAL

## 2021-10-21 VITALS
WEIGHT: 286.5 LBS | OXYGEN SATURATION: 98 % | RESPIRATION RATE: 16 BRPM | BODY MASS INDEX: 48.91 KG/M2 | DIASTOLIC BLOOD PRESSURE: 79 MMHG | HEART RATE: 67 BPM | HEIGHT: 64 IN | SYSTOLIC BLOOD PRESSURE: 126 MMHG

## 2021-10-21 DIAGNOSIS — F41.8 DEPRESSION WITH ANXIETY: Primary | ICD-10-CM

## 2021-10-21 DIAGNOSIS — E55.9 VITAMIN D DEFICIENCY: ICD-10-CM

## 2021-10-21 DIAGNOSIS — E78.5 HYPERLIPIDEMIA LDL GOAL <130: ICD-10-CM

## 2021-10-21 PROCEDURE — 99214 OFFICE O/P EST MOD 30 MIN: CPT | Mod: GC | Performed by: STUDENT IN AN ORGANIZED HEALTH CARE EDUCATION/TRAINING PROGRAM

## 2021-10-21 RX ORDER — VENLAFAXINE 37.5 MG/1
37.5 TABLET ORAL DAILY
Qty: 60 TABLET | Refills: 1 | Status: SHIPPED | OUTPATIENT
Start: 2021-10-21 | End: 2021-10-21

## 2021-10-21 RX ORDER — ERGOCALCIFEROL 1.25 MG/1
50000 CAPSULE, LIQUID FILLED ORAL WEEKLY
Qty: 8 CAPSULE | Refills: 0 | Status: SHIPPED | OUTPATIENT
Start: 2021-10-21 | End: 2021-10-21

## 2021-10-21 RX ORDER — ERGOCALCIFEROL 1.25 MG/1
50000 CAPSULE, LIQUID FILLED ORAL WEEKLY
Qty: 8 CAPSULE | Refills: 0 | Status: SHIPPED | OUTPATIENT
Start: 2021-10-21

## 2021-10-21 RX ORDER — VENLAFAXINE 37.5 MG/1
37.5 TABLET ORAL DAILY
Qty: 60 TABLET | Refills: 1 | Status: SHIPPED | OUTPATIENT
Start: 2021-10-21

## 2021-10-21 ASSESSMENT — PAIN SCALES - GENERAL: PAINLEVEL: NO PAIN (0)

## 2021-10-21 ASSESSMENT — PATIENT HEALTH QUESTIONNAIRE - PHQ9
5. POOR APPETITE OR OVEREATING: MORE THAN HALF THE DAYS
SUM OF ALL RESPONSES TO PHQ QUESTIONS 1-9: 13

## 2021-10-21 ASSESSMENT — MIFFLIN-ST. JEOR: SCORE: 2029.56

## 2021-10-21 ASSESSMENT — ANXIETY QUESTIONNAIRES
IF YOU CHECKED OFF ANY PROBLEMS ON THIS QUESTIONNAIRE, HOW DIFFICULT HAVE THESE PROBLEMS MADE IT FOR YOU TO DO YOUR WORK, TAKE CARE OF THINGS AT HOME, OR GET ALONG WITH OTHER PEOPLE: EXTREMELY DIFFICULT
7. FEELING AFRAID AS IF SOMETHING AWFUL MIGHT HAPPEN: SEVERAL DAYS
1. FEELING NERVOUS, ANXIOUS, OR ON EDGE: MORE THAN HALF THE DAYS
6. BECOMING EASILY ANNOYED OR IRRITABLE: MORE THAN HALF THE DAYS
2. NOT BEING ABLE TO STOP OR CONTROL WORRYING: NEARLY EVERY DAY
3. WORRYING TOO MUCH ABOUT DIFFERENT THINGS: MORE THAN HALF THE DAYS
5. BEING SO RESTLESS THAT IT IS HARD TO SIT STILL: SEVERAL DAYS
GAD7 TOTAL SCORE: 13

## 2021-10-21 NOTE — PROGRESS NOTES
"                     PRIMARY CARE CENTER     SUBJECTIVE:  Jennifer eHller is a 26 year old adult with a PMHx of anxiety, depression, meningitis, protein C deficiency who presents for follow up of depression.     Started taking a day after our last appointment - 40 mg citalopram (4 weeks)   Reports no effect at all and overall feels worse - gets discouraged more easily   Having  and dog has helped    Insurance will be switching in November -  Has been looking into psychiatrist in [facility] town   No appointments currently available, has been feeling anxious calling people on the phone and forgets a lot of times   can help her remember     Sleep - more, 10 pm until 11 am and still tired   Interest - about the same   Guilt - some over felling like not doing enough house work   Energy - low about the same, lower  Concentration - about the same   Appetite - about the same, paying more attention to eating - chicken and yogurt   Psychomotor agitation - none  Suicidal ideations- none      PHQ9 13 today from 16      Current Outpatient Medications   Medication     cetirizine HCl 10 MG CAPS     citalopram (CELEXA) 40 MG tablet     ibuprofen (ADVIL/MOTRIN) 200 MG tablet     levonorgestrel (MIRENA) 20 MCG/24HR IUD     pseudoePHEDrine (SUDAFED) 30 MG tablet     No current facility-administered medications for this visit.     Allergies   Allergen Reactions     Nkda [No Known Drug Allergies]      OBJECTIVE:  /79 (BP Location: Right arm, Patient Position: Sitting, Cuff Size: Adult Large)   Pulse 67   Resp 16   Ht 1.626 m (5' 4\")   Wt 130 kg (286 lb 8 oz)   LMP 09/25/2021 (Exact Date)   SpO2 98%   Breastfeeding No   BMI 49.18 kg/m     Wt Readings from Last 1 Encounters:   10/21/21 130 kg (286 lb 8 oz)     Physical Examination:  Gen: Sitting in chair, speaking slowly, in no acute distress  HEENT: EOMI, nonicteric  Neuro: alert, without focal deficits  Psych: Mood making eye contact, fluent speech, no " SI    ASSESSMENT/PLAN:  Jennifer Heller is a 26 year old adult with a PMHx of anxiety, depression, meningitis, protein C deficiency who presents for depression. Patient lives in [place] half the time, will have to switch insurance in January.    Depression with anxiety  Continues to feel poorly, has not been able to plug into any therapy or psychiatry. She is unsure if she missed calls, has been trying [place] also [place] psychiat[place]. Anxiety appears to be severely limiting her ability to connect with resources. Since citalopram had no effect, will trial venlafaxine with cross taper. Would still like evaluation by psychiatry for alternate diagnosis such as OCD given fixated thoughts, intrusive thoughts.  - Psychiatry and psychotherapy referral placed urgent   - Venlafaxine 37.5 mg for 5 days, then increase to 75 mg  - Citalopram decrease to 20 mg for 5 days, then off   - Follow up in 3 weeks     Vitamin D Deficiency   Normal calcium, ALP, electrolytes and kidney function.   - 50,000 international unit vitamin D2 for 8 weeks  - 800 international unit daily thereafter   - Given level <12, will order phos, PTH, TTG (to assess celiac disease) at next visit labs     Hyperlipidemia   Discussed that goal would be lifestyle modifications. Depression and anxiety are limiting ability to make these changes. Will aim to treat the above, and re-address need for medications in the future.     Pt should return to clinic for f/u with me in 3 weeks.  Pt and plan of care discussed with Dr. Casas.    Deanna Santana MD  Oct 21, 2021  PGY-2, Internal Medicine

## 2021-10-21 NOTE — NURSING NOTE
Jennifer Heller is a 26 year old adult patient that presents today in clinic for the following:    Chief Complaint   Patient presents with     RECHECK     Five week follow-up     The patient's allergies and medications were reviewed as noted. A set of vitals were recorded as noted without incident. The patient does not have any other questions for the provider.    Bernardo Hunt, EMT at 11:55 AM on 10/21/2021

## 2021-10-21 NOTE — PATIENT INSTRUCTIONS
We have placed a psychiatry and psychology referral.     - Start venlafaxine 37.5 mg (1 tablet) daily for 5 days   - Decrease citalopram to 20 mg (1/2 tablet) daily for 5 days  - After 5 days, increase to 75 mg daily (2 tablets) and stop taking citalopram  - Start vitamin D2 50,000 international unit once weekly for 8 weeks  - Follow up in 3 weeks    Mental Health Crisis Resources and Phone Numbers:    [facility]:  [facility]: [phone]  Crisis Residence [facility]: [phone]  Walk-In Counseling [facility]: [phone]  COPE 24/7 [employer] Mobile Team: [phone]    [facility]: [phone]  Walk-in counseling [facility]: [phone]  Walk-in counseling [facility]: [phone]  Crisis Residence [facility]: [phone]  Urgent Care Adult Mental Health:   --Drop-in, 24/7 crisis line, and [place] Mobile Team [phone]    24/7 CRISIS TEXT LINE: www.crisistextline.org OR text 066-699 anywhere, anytime, any crisis    Poison Control Center: 8 [phone]  [facility]: 9 (860) 488-8056; or [employer] [facility] - 9 (123) 501-9147    If you have a medical emergency, please call 911 or go to the nearest emergency department.     Psychiatry [phone] ([address])

## 2021-10-22 ASSESSMENT — ANXIETY QUESTIONNAIRES: GAD7 TOTAL SCORE: 13

## 2021-10-29 ENCOUNTER — TELEPHONE (OUTPATIENT)
Dept: INTERNAL MEDICINE | Facility: CLINIC | Age: 26
End: 2021-10-29

## 2021-10-29 NOTE — TELEPHONE ENCOUNTER
I called psychiatry and pt is on the wait list, will be called by the order the referral received. It will take a week or 2.     Pt was informed and I also gave her the Behavioral health Service Line phone number, [phone] and psychologist Myriam Markham's phone number, .    [facility]  ---------------------------------------------------------------------------------------        ----- Message from Deanna Santana MD sent at 10/28/2021  3:34 PM CDT -----  Regarding: Connect pateint with therapy  Hello,     Would you be able to connect this patient with mental health? She needs to see psychiatry and therapy. She has had a really hard time getting connected and it's been a few months.     Thank you!   Deanna

## 2022-08-03 NOTE — PROGRESS NOTES
PRIMARY CARE CENTER       SUBJECTIVE:  Jennifer Craft is a 22 year old female who comes in for Pap today. This will be her first as she is now 22. Is sexually active. No concerns with period. Having IUD placed today as well.       Medications and allergies reviewed by me today.     ROS:   Constitutional, HEENT, cardiovascular, pulmonary, gi and gu systems are negative, except as otherwise noted.      OBJECTIVE:  /72  Pulse 62  Breastfeeding? No   Wt Readings from Last 1 Encounters:   09/14/17 117 kg (258 lb)       GENERAL APPEARANCE: healthy, alert and no distress     : normal cervix, adnexae, and uterus without masses or discharge and rectal exam normal without masses-guaiac negative stool     ASSESSMENT/PLAN:    Jennifer was seen today for Pap and IUD placement.     Diagnoses and all orders for this visit:      Screening for cervical cancer  -     Pap imaged thin layer screen with HPV - recommended age 30 - 65 years (select HPV order below)   If normal, next due in 3 years.      Pt should return to clinic for f/u with me in PRN      Bree Marie MD  01/04/18     37.4

## 2022-11-20 ENCOUNTER — HEALTH MAINTENANCE LETTER (OUTPATIENT)
Age: 27
End: 2022-11-20

## 2023-11-25 ENCOUNTER — HEALTH MAINTENANCE LETTER (OUTPATIENT)
Age: 28
End: 2023-11-25